# Patient Record
Sex: FEMALE | Race: WHITE | NOT HISPANIC OR LATINO | Employment: OTHER | RURAL
[De-identification: names, ages, dates, MRNs, and addresses within clinical notes are randomized per-mention and may not be internally consistent; named-entity substitution may affect disease eponyms.]

---

## 2022-04-20 RX ORDER — OMEPRAZOLE 40 MG/1
CAPSULE, DELAYED RELEASE ORAL
Qty: 90 CAPSULE | Refills: 0 | Status: SHIPPED | OUTPATIENT
Start: 2022-04-20 | End: 2022-04-20 | Stop reason: SDUPTHER

## 2022-04-21 RX ORDER — OMEPRAZOLE 40 MG/1
40 CAPSULE, DELAYED RELEASE ORAL DAILY
Qty: 90 CAPSULE | Refills: 0 | Status: SHIPPED | OUTPATIENT
Start: 2022-04-21 | End: 2022-05-16 | Stop reason: SDUPTHER

## 2022-05-12 ENCOUNTER — TELEPHONE (OUTPATIENT)
Dept: FAMILY MEDICINE CLINIC | Facility: CLINIC | Age: 67
End: 2022-05-12

## 2022-05-12 DIAGNOSIS — I10 PRIMARY HYPERTENSION: Primary | ICD-10-CM

## 2022-05-12 NOTE — TELEPHONE ENCOUNTER
Patient has upcoming appointment and would like to have labs drawn prior. Please place lab orders.

## 2022-05-13 ENCOUNTER — LAB (OUTPATIENT)
Dept: FAMILY MEDICINE CLINIC | Facility: CLINIC | Age: 67
End: 2022-05-13

## 2022-05-13 DIAGNOSIS — I10 PRIMARY HYPERTENSION: ICD-10-CM

## 2022-05-13 PROCEDURE — 36415 COLL VENOUS BLD VENIPUNCTURE: CPT | Performed by: FAMILY MEDICINE

## 2022-05-13 NOTE — TELEPHONE ENCOUNTER
Notified patients daughter. Looks like patients bw appt was canceled. Advised her that if she wants to come in today she can and we can add her to the bw schedule or just wait for her appt on Monday.

## 2022-05-14 LAB
ALBUMIN SERPL-MCNC: 3.9 G/DL (ref 3.8–4.8)
ALBUMIN/GLOB SERPL: 1.6 {RATIO} (ref 1.2–2.2)
ALP SERPL-CCNC: 81 IU/L (ref 44–121)
ALT SERPL-CCNC: 18 IU/L (ref 0–32)
AST SERPL-CCNC: 24 IU/L (ref 0–40)
BASOPHILS # BLD AUTO: 0.1 X10E3/UL (ref 0–0.2)
BASOPHILS NFR BLD AUTO: 1 %
BILIRUB SERPL-MCNC: 0.3 MG/DL (ref 0–1.2)
BUN SERPL-MCNC: 12 MG/DL (ref 8–27)
BUN/CREAT SERPL: 14 (ref 12–28)
CALCIUM SERPL-MCNC: 9 MG/DL (ref 8.7–10.3)
CHLORIDE SERPL-SCNC: 99 MMOL/L (ref 96–106)
CHOLEST SERPL-MCNC: 224 MG/DL (ref 100–199)
CO2 SERPL-SCNC: 27 MMOL/L (ref 20–29)
CREAT SERPL-MCNC: 0.84 MG/DL (ref 0.57–1)
EGFRCR SERPLBLD CKD-EPI 2021: 75 ML/MIN/1.73
EOSINOPHIL # BLD AUTO: 0.2 X10E3/UL (ref 0–0.4)
EOSINOPHIL NFR BLD AUTO: 2 %
ERYTHROCYTE [DISTWIDTH] IN BLOOD BY AUTOMATED COUNT: 14.9 % (ref 11.7–15.4)
GLOBULIN SER CALC-MCNC: 2.5 G/DL (ref 1.5–4.5)
GLUCOSE SERPL-MCNC: 115 MG/DL (ref 65–99)
HCT VFR BLD AUTO: 32.5 % (ref 34–46.6)
HDLC SERPL-MCNC: 57 MG/DL
HGB BLD-MCNC: 9.9 G/DL (ref 11.1–15.9)
IMM GRANULOCYTES # BLD AUTO: 0 X10E3/UL (ref 0–0.1)
IMM GRANULOCYTES NFR BLD AUTO: 1 %
LDLC SERPL CALC-MCNC: 146 MG/DL (ref 0–99)
LYMPHOCYTES # BLD AUTO: 2.1 X10E3/UL (ref 0.7–3.1)
LYMPHOCYTES NFR BLD AUTO: 23 %
MCH RBC QN AUTO: 24.8 PG (ref 26.6–33)
MCHC RBC AUTO-ENTMCNC: 30.5 G/DL (ref 31.5–35.7)
MCV RBC AUTO: 81 FL (ref 79–97)
MONOCYTES # BLD AUTO: 0.9 X10E3/UL (ref 0.1–0.9)
MONOCYTES NFR BLD AUTO: 10 %
NEUTROPHILS # BLD AUTO: 5.6 X10E3/UL (ref 1.4–7)
NEUTROPHILS NFR BLD AUTO: 63 %
PLATELET # BLD AUTO: 304 X10E3/UL (ref 150–450)
POTASSIUM SERPL-SCNC: 4.6 MMOL/L (ref 3.5–5.2)
PROT SERPL-MCNC: 6.4 G/DL (ref 6–8.5)
RBC # BLD AUTO: 4 X10E6/UL (ref 3.77–5.28)
SODIUM SERPL-SCNC: 140 MMOL/L (ref 134–144)
TRIGL SERPL-MCNC: 120 MG/DL (ref 0–149)
TSH SERPL DL<=0.005 MIU/L-ACNC: 1.26 UIU/ML (ref 0.45–4.5)
VLDLC SERPL CALC-MCNC: 21 MG/DL (ref 5–40)
WBC # BLD AUTO: 8.8 X10E3/UL (ref 3.4–10.8)

## 2022-05-16 ENCOUNTER — OFFICE VISIT (OUTPATIENT)
Dept: FAMILY MEDICINE CLINIC | Facility: CLINIC | Age: 67
End: 2022-05-16

## 2022-05-16 VITALS
OXYGEN SATURATION: 97 % | SYSTOLIC BLOOD PRESSURE: 132 MMHG | WEIGHT: 215 LBS | BODY MASS INDEX: 39.56 KG/M2 | HEIGHT: 62 IN | HEART RATE: 106 BPM | DIASTOLIC BLOOD PRESSURE: 68 MMHG

## 2022-05-16 DIAGNOSIS — D64.9 ANEMIA, UNSPECIFIED TYPE: ICD-10-CM

## 2022-05-16 DIAGNOSIS — J40 BRONCHITIS: Primary | ICD-10-CM

## 2022-05-16 DIAGNOSIS — E78.2 MIXED HYPERLIPIDEMIA: ICD-10-CM

## 2022-05-16 DIAGNOSIS — J44.1 COPD WITH EXACERBATION: ICD-10-CM

## 2022-05-16 DIAGNOSIS — K21.9 GASTROESOPHAGEAL REFLUX DISEASE WITHOUT ESOPHAGITIS: ICD-10-CM

## 2022-05-16 DIAGNOSIS — F41.9 ANXIETY: ICD-10-CM

## 2022-05-16 DIAGNOSIS — I10 PRIMARY HYPERTENSION: ICD-10-CM

## 2022-05-16 PROCEDURE — 36415 COLL VENOUS BLD VENIPUNCTURE: CPT | Performed by: FAMILY MEDICINE

## 2022-05-16 PROCEDURE — 99214 OFFICE O/P EST MOD 30 MIN: CPT | Performed by: FAMILY MEDICINE

## 2022-05-16 RX ORDER — LISINOPRIL 10 MG/1
10 TABLET ORAL DAILY
COMMUNITY
Start: 2022-04-18 | End: 2022-05-16 | Stop reason: SDUPTHER

## 2022-05-16 RX ORDER — FLUTICASONE PROPIONATE AND SALMETEROL 250; 50 UG/1; UG/1
1 POWDER RESPIRATORY (INHALATION)
Qty: 60 EACH | Refills: 5 | Status: SHIPPED | OUTPATIENT
Start: 2022-05-16 | End: 2022-12-08

## 2022-05-16 RX ORDER — ALPRAZOLAM 1 MG/1
1 TABLET ORAL 2 TIMES DAILY PRN
Qty: 60 TABLET | Refills: 2 | Status: SHIPPED | OUTPATIENT
Start: 2022-05-16 | End: 2022-08-25

## 2022-05-16 RX ORDER — MELOXICAM 7.5 MG/1
7.5 TABLET ORAL DAILY
COMMUNITY
Start: 2022-02-19 | End: 2022-05-16 | Stop reason: SDUPTHER

## 2022-05-16 RX ORDER — LISINOPRIL 10 MG/1
10 TABLET ORAL DAILY
Qty: 90 TABLET | Refills: 1 | Status: SHIPPED | OUTPATIENT
Start: 2022-05-16 | End: 2022-12-08 | Stop reason: SDUPTHER

## 2022-05-16 RX ORDER — MELOXICAM 7.5 MG/1
7.5 TABLET ORAL DAILY
Qty: 90 TABLET | Refills: 1 | Status: SHIPPED | OUTPATIENT
Start: 2022-05-16 | End: 2022-12-08 | Stop reason: SDUPTHER

## 2022-05-16 RX ORDER — ALPRAZOLAM 1 MG/1
1 TABLET ORAL 2 TIMES DAILY PRN
COMMUNITY
Start: 2022-04-19 | End: 2022-05-16 | Stop reason: SDUPTHER

## 2022-05-16 RX ORDER — ROSUVASTATIN CALCIUM 20 MG/1
20 TABLET, COATED ORAL DAILY
Qty: 90 TABLET | Refills: 1 | Status: SHIPPED | OUTPATIENT
Start: 2022-05-16 | End: 2022-12-08 | Stop reason: SDUPTHER

## 2022-05-16 RX ORDER — DOXYCYCLINE 100 MG/1
100 TABLET ORAL 2 TIMES DAILY
Qty: 20 TABLET | Refills: 0 | Status: SHIPPED | OUTPATIENT
Start: 2022-05-16 | End: 2022-09-08

## 2022-05-16 RX ORDER — PREDNISONE 20 MG/1
40 TABLET ORAL DAILY
COMMUNITY
Start: 2022-05-14 | End: 2022-09-08

## 2022-05-16 RX ORDER — ROSUVASTATIN CALCIUM 20 MG/1
20 TABLET, COATED ORAL DAILY
COMMUNITY
Start: 2022-04-18 | End: 2022-05-16 | Stop reason: SDUPTHER

## 2022-05-16 RX ORDER — ALBUTEROL SULFATE 1.25 MG/3ML
1 SOLUTION RESPIRATORY (INHALATION)
COMMUNITY
Start: 2022-05-14 | End: 2023-03-08 | Stop reason: SDUPTHER

## 2022-05-16 RX ORDER — OMEPRAZOLE 40 MG/1
40 CAPSULE, DELAYED RELEASE ORAL DAILY
Qty: 90 CAPSULE | Refills: 1 | Status: SHIPPED | OUTPATIENT
Start: 2022-05-16 | End: 2022-05-27

## 2022-05-17 LAB
FERRITIN SERPL-MCNC: 5 NG/ML (ref 15–150)
IRON SATN MFR SERPL: 5 % (ref 15–55)
IRON SERPL-MCNC: 20 UG/DL (ref 27–139)
TIBC SERPL-MCNC: 380 UG/DL (ref 250–450)
UIBC SERPL-MCNC: 360 UG/DL (ref 118–369)
VIT B12 SERPL-MCNC: 385 PG/ML (ref 232–1245)

## 2022-05-18 ENCOUNTER — TELEPHONE (OUTPATIENT)
Dept: FAMILY MEDICINE CLINIC | Facility: CLINIC | Age: 67
End: 2022-05-18

## 2022-05-18 NOTE — PROGRESS NOTES
Follow Up Office Visit      Date of Visit:  2022   Patient Name: Class 2 Severe Obesity (BMI >=35 and <=39.9). Obesity-related health conditions include the following: hypertension. Obesity is unchanged. BMI is is above average; BMI management plan is completed. We discussed portion control and increasing exercise.  : Class 2 Severe Obesity (BMI >=35 and <=39.9). Obesity-related health conditions include the following: hypertension. Obesity is unchanged. BMI is is above average; BMI management plan is completed. We discussed portion control and increasing exercise.   MRN: [id number]     Chief Complaint:    Chief Complaint   Patient presents with   • Med Refill   • [place]     [facility] [place] of Present Illness: Radha Aldana is a 67 y.o. female who is here today for follow up.  Patient seen for follow-up on her chronic medical conditions.  More recently had been to the hospital with a COPD exacerbation.  Still having some symptoms.  Patient needs GERD COPD hypertension hyperlipidemia.  Cmedication refills on her chronic medical conditions.  Currently being treated for anxiety conditions overall stable.        Subjective      Review of Systems:   Review of Systems   Constitutional: Negative for fatigue and fever.   HENT: Negative for congestion and ear pain.    Respiratory: Positive for shortness of breath and wheezing. Negative for apnea, cough and chest tightness.    Cardiovascular: Negative for chest pain.   Gastrointestinal: Negative for abdominal pain, constipation, diarrhea and nausea.   Musculoskeletal: Negative for arthralgias.   Psychiatric/Behavioral: Negative for depressed mood and stress.       Past Medical History:   Past Medical History:   Diagnosis Date   • Anxiety    • Chronic GERD    • Emphysema    • Essential hypertension    • Mixed hyperlipidemia        Past Surgical History: History reviewed. No pertinent surgical history.    Family History:   Family History   Problem  "Relation Age of Onset   • Hypertension Father        Social History:   Social History     Socioeconomic History   • Marital status:    Tobacco Use   • Smoking status: Former Smoker     Packs/day: 1.00     Years: 55.00     Pack years: 55.00     Start date:      Quit date:      Years since quittin.3   • Smokeless tobacco: Never Used   Substance and Sexual Activity   • Alcohol use: Defer   • Drug use: Never   • Sexual activity: Defer       Medications:     Current Outpatient Medications:   •  albuterol (ACCUNEB) 1.25 MG/3ML nebulizer solution, 1 ampule., Disp: , Rfl:   •  ALPRAZolam (XANAX) 1 MG tablet, Take 1 tablet by mouth 2 (Two) Times a Day As Needed for Anxiety., Disp: 60 tablet, Rfl: 2  •  lisinopril (PRINIVIL,ZESTRIL) 10 MG tablet, Take 1 tablet by mouth Daily., Disp: 90 tablet, Rfl: 1  •  meloxicam (MOBIC) 7.5 MG tablet, Take 1 tablet by mouth Daily., Disp: 90 tablet, Rfl: 1  •  omeprazole (priLOSEC) 40 MG capsule, Take 1 capsule by mouth Daily. before a meal, Disp: 90 capsule, Rfl: 1  •  predniSONE (DELTASONE) 20 MG tablet, Take 40 mg by mouth Daily., Disp: , Rfl:   •  rosuvastatin (CRESTOR) 20 MG tablet, Take 1 tablet by mouth Daily., Disp: 90 tablet, Rfl: 1  •  doxycycline (ADOXA) 100 MG tablet, Take 1 tablet by mouth 2 (Two) Times a Day., Disp: 20 tablet, Rfl: 0  •  Fluticasone-Salmeterol (Advair Diskus) 250-50 MCG/ACT DISKUS, Inhale 1 puff 2 (Two) Times a Day., Disp: 60 each, Rfl: 5    Allergies:   No Known Allergies    Objective     Physical Exam:  Vital Signs:   Vitals:    22 1319   BP: 132/68   BP Location: Left arm   Patient Position: Sitting   Cuff Size: Large Adult   Pulse: 106   SpO2: 97%   Weight: 97.5 kg (215 lb)   Height: 157.5 cm (62\")     Body mass index is 39.32 kg/m².     Physical Exam  Vitals and nursing note reviewed.   Constitutional:       Appearance: She is ill-appearing.   HENT:      Head: Normocephalic and atraumatic.   Cardiovascular:      Rate and Rhythm: " Normal rate and regular rhythm.   Pulmonary:      Breath sounds: Decreased air movement present. Wheezing present.   Neurological:      General: No focal deficit present.      Mental Status: She is alert and oriented to person, place, and time.   Psychiatric:         Mood and Affect: Mood normal.         Procedures           Assessment / Plan      Assessment/Plan:   Diagnoses and all orders for this visit:    1. Bronchitis (Primary)    2. Anemia, unspecified type  -     Vitamin B12; Future  -     Ferritin; Future  -     Iron and TIBC; Future  -     POCT urinalysis dipstick, automated  -     Vitamin B12  -     Ferritin  -     Iron and TIBC    3. Anxiety  -     ALPRAZolam (XANAX) 1 MG tablet; Take 1 tablet by mouth 2 (Two) Times a Day As Needed for Anxiety.  Dispense: 60 tablet; Refill: 2    4. Primary hypertension    5. Gastroesophageal reflux disease without esophagitis    6. Mixed hyperlipidemia    7. COPD with exacerbation (HCC)    Other orders  -     doxycycline (ADOXA) 100 MG tablet; Take 1 tablet by mouth 2 (Two) Times a Day.  Dispense: 20 tablet; Refill: 0  -     Fluticasone-Salmeterol (Advair Diskus) 250-50 MCG/ACT DISKUS; Inhale 1 puff 2 (Two) Times a Day.  Dispense: 60 each; Refill: 5  -     lisinopril (PRINIVIL,ZESTRIL) 10 MG tablet; Take 1 tablet by mouth Daily.  Dispense: 90 tablet; Refill: 1  -     meloxicam (MOBIC) 7.5 MG tablet; Take 1 tablet by mouth Daily.  Dispense: 90 tablet; Refill: 1  -     omeprazole (priLOSEC) 40 MG capsule; Take 1 capsule by mouth Daily. before a meal  Dispense: 90 capsule; Refill: 1  -     rosuvastatin (CRESTOR) 20 MG tablet; Take 1 tablet by mouth Daily.  Dispense: 90 tablet; Refill: 1         Medication refills given on her chronic medical conditions.  Did give doxycycline for her current COPD exacerbation.  Blood work obtained for her chronic medical conditions as well as her anemia.    Follow Up:   Return in about 3 months (around 8/16/2022) for Recheck.    Wilfrid  [facility] [place]

## 2022-05-27 RX ORDER — OMEPRAZOLE 40 MG/1
CAPSULE, DELAYED RELEASE ORAL
Qty: 90 CAPSULE | Refills: 0 | Status: SHIPPED | OUTPATIENT
Start: 2022-05-27 | End: 2022-12-08 | Stop reason: SDUPTHER

## 2022-08-19 DIAGNOSIS — F41.9 ANXIETY: ICD-10-CM

## 2022-08-19 NOTE — TELEPHONE ENCOUNTER
Rx Refill Note  Requested Prescriptions     Pending Prescriptions Disp Refills    ALPRAZolam (XANAX) 1 MG tablet [Pharmacy Med Name: ALPRAZolam 1 MG Oral Tablet] 60 tablet 0     Sig: Take 1 tablet by mouth twice daily as needed for anxiety      Last office visit with prescribing clinician: 5/16/2022      Next office visit with prescribing clinician: 9/8/2022            Kanchan Anderson MA  08/19/22, 14:48 EDT

## 2022-08-25 RX ORDER — ALPRAZOLAM 1 MG/1
TABLET ORAL
Qty: 15 TABLET | Refills: 0 | Status: SHIPPED | OUTPATIENT
Start: 2022-08-25 | End: 2022-09-08 | Stop reason: SDUPTHER

## 2022-09-06 ENCOUNTER — CLINICAL SUPPORT (OUTPATIENT)
Dept: FAMILY MEDICINE CLINIC | Facility: CLINIC | Age: 67
End: 2022-09-06

## 2022-09-06 DIAGNOSIS — Z79.899 ENCOUNTER FOR LONG-TERM (CURRENT) USE OF OTHER MEDICATIONS: Primary | ICD-10-CM

## 2022-09-06 LAB
POC AMPHETAMINES: NEGATIVE
POC BARBITURATES: NEGATIVE
POC BENZODIAZEPHINES: POSITIVE
POC COCAINE: NEGATIVE
POC METHADONE: NEGATIVE
POC METHAMPHETAMINE SCREEN URINE: NEGATIVE
POC OPIATES: NEGATIVE
POC OXYCODONE: NEGATIVE
POC PHENCYCLIDINE: NEGATIVE
POC PROPOXYPHENE: NEGATIVE
POC THC: NEGATIVE
POC TRICYCLIC ANTIDEPRESSANTS: NEGATIVE

## 2022-09-06 PROCEDURE — 80305 DRUG TEST PRSMV DIR OPT OBS: CPT | Performed by: FAMILY MEDICINE

## 2022-09-07 ENCOUNTER — TELEPHONE (OUTPATIENT)
Dept: FAMILY MEDICINE CLINIC | Facility: CLINIC | Age: 67
End: 2022-09-07

## 2022-09-07 NOTE — TELEPHONE ENCOUNTER
Spoke with patient and informed her this was probably just a reminder call due to the fact there is nothing in her chart about needing to schedule.

## 2022-09-07 NOTE — TELEPHONE ENCOUNTER
Caller: Radha Aldana    Relationship to patient: Self    Best call back number:     Patient is needing: PATIENT WAS RETURNING CALL TO THE OFFICE FOR A TEST THAT IS TO BE SCHEDULED?     PLEASE TO CALL ADVISE

## 2022-09-08 ENCOUNTER — OFFICE VISIT (OUTPATIENT)
Dept: FAMILY MEDICINE CLINIC | Facility: CLINIC | Age: 67
End: 2022-09-08

## 2022-09-08 VITALS
HEIGHT: 62 IN | HEART RATE: 110 BPM | SYSTOLIC BLOOD PRESSURE: 124 MMHG | WEIGHT: 219 LBS | DIASTOLIC BLOOD PRESSURE: 78 MMHG | OXYGEN SATURATION: 98 % | BODY MASS INDEX: 40.3 KG/M2

## 2022-09-08 DIAGNOSIS — F41.9 ANXIETY: ICD-10-CM

## 2022-09-08 DIAGNOSIS — M54.50 LOW BACK PAIN, UNSPECIFIED BACK PAIN LATERALITY, UNSPECIFIED CHRONICITY, UNSPECIFIED WHETHER SCIATICA PRESENT: ICD-10-CM

## 2022-09-08 DIAGNOSIS — D64.9 ANEMIA, UNSPECIFIED TYPE: Primary | ICD-10-CM

## 2022-09-08 DIAGNOSIS — R30.0 DYSURIA: ICD-10-CM

## 2022-09-08 LAB
BILIRUB BLD-MCNC: NEGATIVE MG/DL
CLARITY, POC: CLEAR
COLOR UR: YELLOW
EXPIRATION DATE: ABNORMAL
GLUCOSE UR STRIP-MCNC: NEGATIVE MG/DL
KETONES UR QL: NEGATIVE
LEUKOCYTE EST, POC: ABNORMAL
Lab: ABNORMAL
NITRITE UR-MCNC: NEGATIVE MG/ML
PH UR: 7 [PH] (ref 5–8)
PROT UR STRIP-MCNC: ABNORMAL MG/DL
RBC # UR STRIP: NEGATIVE /UL
SP GR UR: 1.02 (ref 1–1.03)
UROBILINOGEN UR QL: NORMAL

## 2022-09-08 PROCEDURE — 36415 COLL VENOUS BLD VENIPUNCTURE: CPT | Performed by: FAMILY MEDICINE

## 2022-09-08 PROCEDURE — 99214 OFFICE O/P EST MOD 30 MIN: CPT | Performed by: FAMILY MEDICINE

## 2022-09-08 PROCEDURE — 81003 URINALYSIS AUTO W/O SCOPE: CPT | Performed by: FAMILY MEDICINE

## 2022-09-08 RX ORDER — ALPRAZOLAM 1 MG/1
1 TABLET ORAL 2 TIMES DAILY PRN
Qty: 60 TABLET | Refills: 2 | Status: SHIPPED | OUTPATIENT
Start: 2022-09-08 | End: 2022-12-08 | Stop reason: SDUPTHER

## 2022-09-08 NOTE — PROGRESS NOTES
Follow Up Office Visit      Date of Visit:  2022   Patient Name: Radha Aldana  : 1955   MRN: [id number]     Chief Complaint:    Chief Complaint   Patient presents with   • Med Refill       History of Present Illness: Radha Aldana is a 67 y.o. female who is here today for follow up.  Patient comes in for refills on her anxiety medication.  Medical conditions overall stable.  Carlos report appropriate.  Patient also with recent anemia and needed blood work for reevaluation.  Also with UTI symptoms.        Subjective      Review of Systems:   Review of Systems   Constitutional: Negative for fatigue and fever.   HENT: Negative for congestion and ear pain.    Respiratory: Negative for apnea, cough, chest tightness and shortness of breath.    Cardiovascular: Negative for chest pain.   Gastrointestinal: Negative for abdominal pain, constipation, diarrhea and nausea.   Musculoskeletal: Negative for arthralgias.   Psychiatric/Behavioral: Negative for depressed mood and stress.       Past Medical History:   Past Medical History:   Diagnosis Date   • Anxiety    • Chronic GERD    • Emphysema    • Essential hypertension    • Mixed hyperlipidemia        Past Surgical History: No past surgical history on file.    Family History:   Family History   Problem Relation Age of Onset   • Hypertension Father        Social History:   Social History     Socioeconomic History   • Marital status:    Tobacco Use   • Smoking status: Former Smoker     Packs/day: 1.00     Years: 55.00     Pack years: 55.00     Start date:      Quit date:      Years since quittin.6   • Smokeless tobacco: Never Used   Substance and Sexual Activity   • Alcohol use: Defer   • Drug use: Never   • Sexual activity: Defer       Medications:     Current Outpatient Medications:   •  albuterol (ACCUNEB) 1.25 MG/3ML nebulizer solution, 1 ampule., Disp: , Rfl:   •  ALPRAZolam (XANAX) 1 MG tablet, Take 1 tablet by mouth 2 (Two) Times a  "Day As Needed for Anxiety. for anxiety, Disp: 60 tablet, Rfl: 2  •  Fluticasone-Salmeterol (Advair Diskus) 250-50 MCG/ACT DISKUS, Inhale 1 puff 2 (Two) Times a Day., Disp: 60 each, Rfl: 5  •  lisinopril (PRINIVIL,ZESTRIL) 10 MG tablet, Take 1 tablet by mouth Daily., Disp: 90 tablet, Rfl: 1  •  meloxicam (MOBIC) 7.5 MG tablet, Take 1 tablet by mouth Daily., Disp: 90 tablet, Rfl: 1  •  omeprazole (priLOSEC) 40 MG capsule, TAKE 1 CAPSULE BY MOUTH ONCE DAILY BEFORE A MEAL, Disp: 90 capsule, Rfl: 0  •  rosuvastatin (CRESTOR) 20 MG tablet, Take 1 tablet by mouth Daily., Disp: 90 tablet, Rfl: 1    Allergies:   Allergies   Allergen Reactions   • Zocor [Simvastatin] Myalgia       Objective     Physical Exam:  Vital Signs:   Vitals:    09/08/22 1333   BP: 124/78   Pulse: 110   SpO2: 98%   Weight: 99.3 kg (219 lb)   Height: 157.5 cm (62\")     Body mass index is 40.06 kg/m².     Physical Exam  Vitals and nursing note reviewed.   Constitutional:       General: She is not in acute distress.     Appearance: Normal appearance. She is not ill-appearing.   HENT:      Head: Normocephalic and atraumatic.      Right Ear: Tympanic membrane and ear canal normal.      Left Ear: Tympanic membrane and ear canal normal.      Nose: Nose normal.   Cardiovascular:      Rate and Rhythm: Normal rate and regular rhythm.      Heart sounds: Normal heart sounds.   Pulmonary:      Effort: Pulmonary effort is normal.      Breath sounds: Normal breath sounds.   Neurological:      Mental Status: She is alert and oriented to person, place, and time. Mental status is at baseline.   Psychiatric:         Mood and Affect: Mood normal.         Procedures      Assessment / Plan      Assessment/Plan:   Diagnoses and all orders for this visit:    1. Anemia, unspecified type (Primary)  -     CBC w AUTO Differential; Future  -     Vitamin B12; Future  -     Ferritin; Future  -     CBC w AUTO Differential  -     Vitamin B12  -     Ferritin    2. Anxiety  -     " ALPRAZolam (XANAX) 1 MG tablet; Take 1 tablet by mouth 2 (Two) Times a Day As Needed for Anxiety. for anxiety  Dispense: 60 tablet; Refill: 2    3. Low back pain, unspecified back pain laterality, unspecified chronicity, unspecified whether sciatica present  -     POCT urinalysis dipstick, automated    4. Dysuria  -     Urine Culture - Urine, Urine, Clean Catch; Future  -     Urine Culture - Urine, Urine, Clean Catch         Check lab work for anemia.  Refill medications for anxiety.  Treat urine.    Follow Up:   No follow-ups on file.    Wilfrid Victoria  [facility] [place] [place]

## 2022-09-09 LAB
BASOPHILS # BLD AUTO: 0.1 X10E3/UL (ref 0–0.2)
BASOPHILS NFR BLD AUTO: 1 %
EOSINOPHIL # BLD AUTO: 0.3 X10E3/UL (ref 0–0.4)
EOSINOPHIL NFR BLD AUTO: 3 %
ERYTHROCYTE [DISTWIDTH] IN BLOOD BY AUTOMATED COUNT: 12.8 % (ref 11.7–15.4)
FERRITIN SERPL-MCNC: 15 NG/ML (ref 15–150)
HCT VFR BLD AUTO: 37.7 % (ref 34–46.6)
HGB BLD-MCNC: 12.4 G/DL (ref 11.1–15.9)
IMM GRANULOCYTES # BLD AUTO: 0 X10E3/UL (ref 0–0.1)
IMM GRANULOCYTES NFR BLD AUTO: 0 %
LYMPHOCYTES # BLD AUTO: 2.8 X10E3/UL (ref 0.7–3.1)
LYMPHOCYTES NFR BLD AUTO: 27 %
MCH RBC QN AUTO: 29.7 PG (ref 26.6–33)
MCHC RBC AUTO-ENTMCNC: 32.9 G/DL (ref 31.5–35.7)
MCV RBC AUTO: 90 FL (ref 79–97)
MONOCYTES # BLD AUTO: 1 X10E3/UL (ref 0.1–0.9)
MONOCYTES NFR BLD AUTO: 10 %
NEUTROPHILS # BLD AUTO: 6.1 X10E3/UL (ref 1.4–7)
NEUTROPHILS NFR BLD AUTO: 59 %
PLATELET # BLD AUTO: 224 X10E3/UL (ref 150–450)
RBC # BLD AUTO: 4.17 X10E6/UL (ref 3.77–5.28)
VIT B12 SERPL-MCNC: 336 PG/ML (ref 232–1245)
WBC # BLD AUTO: 10.3 X10E3/UL (ref 3.4–10.8)

## 2022-09-10 LAB
BACTERIA UR CULT: NORMAL
BACTERIA UR CULT: NORMAL

## 2022-09-12 ENCOUNTER — TELEPHONE (OUTPATIENT)
Dept: FAMILY MEDICINE CLINIC | Facility: CLINIC | Age: 67
End: 2022-09-12

## 2022-09-12 NOTE — TELEPHONE ENCOUNTER
Caller: Radha Aldana    Relationship: Self    Best call back number: [phone]    Caller requesting test results: RADHA    What test was performed: LABS    When was the test performed: 9/8/22    Where was the test performed: IN OFFICE    Additional notes: PLEASE CALL PATIENT WITH THE RESULTS

## 2022-09-13 NOTE — TELEPHONE ENCOUNTER
No anemia seen on labs this time.  Her blood counts are normal.  Her urine also did not grow any bacteria.  No UTI.

## 2022-09-14 ENCOUNTER — TELEPHONE (OUTPATIENT)
Dept: FAMILY MEDICINE CLINIC | Facility: CLINIC | Age: 67
End: 2022-09-14

## 2022-09-14 NOTE — TELEPHONE ENCOUNTER
Pt returned call. Went over lab results. She said she is still having trouble with burning. We spoke for a few minutes and she said it is not an urge to go or burning when she urinates, it is the skin on the exterior of her vagina that is painful. Would like to know if there is an OTC or prescription cream that would help with that. She said that she has sensitive skin.  Please call to advise [phone]

## 2022-09-15 RX ORDER — NYSTATIN 100000 U/G
1 CREAM TOPICAL 2 TIMES DAILY
Qty: 30 G | Refills: 1 | Status: SHIPPED | OUTPATIENT
Start: 2022-09-15 | End: 2023-02-01

## 2022-12-08 ENCOUNTER — OFFICE VISIT (OUTPATIENT)
Dept: FAMILY MEDICINE CLINIC | Facility: CLINIC | Age: 67
End: 2022-12-08

## 2022-12-08 VITALS
BODY MASS INDEX: 41.77 KG/M2 | WEIGHT: 227 LBS | HEART RATE: 115 BPM | OXYGEN SATURATION: 97 % | SYSTOLIC BLOOD PRESSURE: 144 MMHG | DIASTOLIC BLOOD PRESSURE: 82 MMHG | HEIGHT: 62 IN

## 2022-12-08 DIAGNOSIS — Z00.00 WELLNESS EXAMINATION: Primary | ICD-10-CM

## 2022-12-08 DIAGNOSIS — F41.9 ANXIETY: ICD-10-CM

## 2022-12-08 DIAGNOSIS — F51.01 PRIMARY INSOMNIA: ICD-10-CM

## 2022-12-08 DIAGNOSIS — M25.551 RIGHT HIP PAIN: ICD-10-CM

## 2022-12-08 DIAGNOSIS — J44.9 CHRONIC OBSTRUCTIVE PULMONARY DISEASE, UNSPECIFIED COPD TYPE: ICD-10-CM

## 2022-12-08 DIAGNOSIS — E78.2 MIXED HYPERLIPIDEMIA: ICD-10-CM

## 2022-12-08 DIAGNOSIS — I10 PRIMARY HYPERTENSION: ICD-10-CM

## 2022-12-08 PROCEDURE — 96160 PT-FOCUSED HLTH RISK ASSMT: CPT | Performed by: FAMILY MEDICINE

## 2022-12-08 PROCEDURE — 1159F MED LIST DOCD IN RCRD: CPT | Performed by: FAMILY MEDICINE

## 2022-12-08 PROCEDURE — G0439 PPPS, SUBSEQ VISIT: HCPCS | Performed by: FAMILY MEDICINE

## 2022-12-08 PROCEDURE — 1170F FXNL STATUS ASSESSED: CPT | Performed by: FAMILY MEDICINE

## 2022-12-08 PROCEDURE — 99214 OFFICE O/P EST MOD 30 MIN: CPT | Performed by: FAMILY MEDICINE

## 2022-12-08 RX ORDER — TIOTROPIUM BROMIDE AND OLODATEROL 3.124; 2.736 UG/1; UG/1
2 SPRAY, METERED RESPIRATORY (INHALATION) DAILY
COMMUNITY
Start: 2022-11-11

## 2022-12-08 RX ORDER — LISINOPRIL 10 MG/1
10 TABLET ORAL DAILY
Qty: 90 TABLET | Refills: 1 | Status: SHIPPED | OUTPATIENT
Start: 2022-12-08 | End: 2023-03-28 | Stop reason: SDUPTHER

## 2022-12-08 RX ORDER — TRAZODONE HYDROCHLORIDE 50 MG/1
50 TABLET ORAL NIGHTLY
Qty: 90 TABLET | Refills: 1 | Status: SHIPPED | OUTPATIENT
Start: 2022-12-08

## 2022-12-08 RX ORDER — ALPRAZOLAM 1 MG/1
1 TABLET ORAL 2 TIMES DAILY PRN
Qty: 60 TABLET | Refills: 2 | Status: SHIPPED | OUTPATIENT
Start: 2022-12-08 | End: 2023-03-08 | Stop reason: SDUPTHER

## 2022-12-08 RX ORDER — OMEPRAZOLE 40 MG/1
40 CAPSULE, DELAYED RELEASE ORAL DAILY
Qty: 90 CAPSULE | Refills: 1 | Status: SHIPPED | OUTPATIENT
Start: 2022-12-08

## 2022-12-08 RX ORDER — MELOXICAM 7.5 MG/1
7.5 TABLET ORAL DAILY
Qty: 90 TABLET | Refills: 1 | Status: SHIPPED | OUTPATIENT
Start: 2022-12-08

## 2022-12-08 RX ORDER — HYDROCODONE BITARTRATE AND ACETAMINOPHEN 5; 325 MG/1; MG/1
1 TABLET ORAL EVERY 6 HOURS PRN
Qty: 15 TABLET | Refills: 0 | Status: SHIPPED | OUTPATIENT
Start: 2022-12-08

## 2022-12-08 RX ORDER — ROSUVASTATIN CALCIUM 20 MG/1
20 TABLET, COATED ORAL DAILY
Qty: 90 TABLET | Refills: 1 | Status: SHIPPED | OUTPATIENT
Start: 2022-12-08

## 2022-12-08 NOTE — PROGRESS NOTES
The ABCs of the Annual Wellness Visit  Subsequent Medicare Wellness Visit    Subjective    Radha Aldana is a 67 y.o. female who presents for a Subsequent Medicare Wellness Visit.    The following portions of the patient's history were reviewed and   updated as appropriate: allergies, current medications, past family history, past medical history, past social history, past surgical history and problem list.    Compared to one year ago, the patient feels her physical   health is the same.    Compared to one year ago, the patient feels her mental   health is the same.    Recent Hospitalizations:  She was admitted within the past 365 days at [facility].       Current Medical Providers:  Patient Care Team:  Wilfrid Victoria MD as PCP - General (Family Medicine)    Outpatient Medications Prior to Visit   Medication Sig Dispense Refill   • albuterol (ACCUNEB) 1.25 MG/3ML nebulizer solution 1 ampule.     • nystatin (MYCOSTATIN) 958261 UNIT/GM cream Apply 1 application topically to the appropriate area as directed 2 (Two) Times a Day. 30 g 1   • Stiolto Respimat 2.5-2.5 MCG/ACT aerosol solution inhaler Inhale 2 puffs Daily.     • ALPRAZolam (XANAX) 1 MG tablet Take 1 tablet by mouth 2 (Two) Times a Day As Needed for Anxiety. for anxiety 60 tablet 2   • lisinopril (PRINIVIL,ZESTRIL) 10 MG tablet Take 1 tablet by mouth Daily. 90 tablet 1   • meloxicam (MOBIC) 7.5 MG tablet Take 1 tablet by mouth Daily. 90 tablet 1   • omeprazole (priLOSEC) 40 MG capsule TAKE 1 CAPSULE BY MOUTH ONCE DAILY BEFORE A MEAL 90 capsule 0   • rosuvastatin (CRESTOR) 20 MG tablet Take 1 tablet by mouth Daily. 90 tablet 1   • Fluticasone-Salmeterol (Advair Diskus) 250-50 MCG/ACT DISKUS Inhale 1 puff 2 (Two) Times a Day. 60 each 5     No facility-administered medications prior to visit.       Opioid medication/s are on active medication list.  and I have evaluated her active treatment plan and pain score trends (see table).  There were no vitals  "filed for this visit.  I have reviewed the chart for potential of high risk medication and harmful drug interactions in the elderly.            Aspirin is not on active medication list.  Aspirin use is not indicated based on review of current medical condition/s. Risk of harm outweighs potential benefits.  .    There is no problem list on file for this patient.    Advance Care Planning  Advance Directive is not on file.  ACP discussion was held with the patient during this visit. Patient does not have an advance directive, information provided.     Objective    Vitals:    22 1313   BP: 144/82   Pulse: 115   SpO2: 97%   Weight: 103 kg (227 lb)   Height: 157.5 cm (62\")     Estimated body mass index is 41.52 kg/m² as calculated from the following:    Height as of this encounter: 157.5 cm (62\").    Weight as of this encounter: 103 kg (227 lb).    Class 3 Severe Obesity (BMI >=40). Obesity-related health conditions include the following: hypertension and osteoarthritis. Obesity is unchanged. BMI is is above average; BMI management plan is completed. We discussed portion control and increasing exercise.      Does the patient have evidence of cognitive impairment? No          HEALTH RISK ASSESSMENT    Smoking Status:  Social History     Tobacco Use   Smoking Status Former   • Packs/day: 1.00   • Years: 55.00   • Pack years: 55.00   • Types: Cigarettes   • Start date:    • Quit date:    • Years since quittin.9   Smokeless Tobacco Never     Alcohol Consumption:  Social History     Substance and Sexual Activity   Alcohol Use Defer     Fall Risk Screen:    STEADI Fall Risk Assessment was completed, and patient is at LOW risk for falls.Assessment completed on:2022    Depression Screening:  PHQ-2/PHQ-9 Depression Screening 2022   Little Interest or Pleasure in Doing Things 0-->not at all   Feeling Down, Depressed or Hopeless 0-->not at all   PHQ-9: Brief Depression Severity Measure Score 0 "       Health Habits and Functional and Cognitive Screening:  Functional & Cognitive Status 12/8/2022   Do you have difficulty preparing food and eating? Yes   Do you have difficulty bathing yourself, getting dressed or grooming yourself? No   Do you have difficulty using the toilet? No   Do you have difficulty moving around from place to place? No   Do you have trouble with steps or getting out of a bed or a chair? No   Current Diet Well Balanced Diet   Dental Exam Other        Dental Exam Comment false teeth   Eye Exam Up to date   Exercise (times per week) 0 times per week   Current Exercises Include No Regular Exercise   Do you need help using the phone?  No   Are you deaf or do you have serious difficulty hearing?  No   Do you need help with transportation? No   Do you need help shopping? No   Do you need help preparing meals?  No   Do you need help with housework?  No   Do you need help with laundry? No   Do you need help taking your medications? No   Do you need help managing money? No   Do you ever drive or ride in a car without wearing a seat belt? No   Have you felt unusual stress, anger or loneliness in the last month? No   Who do you live with? Spouse   If you need help, do you have trouble finding someone available to you? No   Have you been bothered in the last four weeks by sexual problems? No   Do you have difficulty concentrating, remembering or making decisions? No       Age-appropriate Screening Schedule:  Refer to the list below for future screening recommendations based on patient's age, sex and/or medical conditions. Orders for these recommended tests are listed in the plan section. The patient has been provided with a written plan.    Health Maintenance   Topic Date Due   • MAMMOGRAM  Never done   • DXA SCAN  Never done   • TDAP/TD VACCINES (1 - Tdap) Never done   • ZOSTER VACCINE (1 of 2) Never done   • INFLUENZA VACCINE  03/31/2023 (Originally 8/1/2022)   • LIPID PANEL  05/13/2023           "      CMS Preventative Services Quick Reference  Risk Factors Identified During Encounter  None Identified  The above risks/problems have been discussed with the patient.  Pertinent information has been shared with the patient in the After Visit Summary.  An After Visit Summary and PPPS were made available to the patient.    Follow Up:   Next Medicare Wellness visit to be scheduled in 1 year.       Additional E&M Note during same encounter follows:  Patient has multiple medical problems which are significant and separately identifiable that require additional work above and beyond the Medicare Wellness Visit.      Chief Complaint  Annual Exam (Pt is here for a annual wellness exam today. ) and Hip Pain (C/o right hip pain. )    Subjective        Patient also with severe pain in her right hip today.  Appears to have a severe bursitis.  Having hard time sitting for very long or walking around either.    Radha Aldana is also being seen today for annual wellness exam.    Review of Systems   Constitutional: Negative for fatigue.   HENT: Negative for congestion.    Respiratory: Negative for apnea, cough and shortness of breath.    Cardiovascular: Negative for chest pain.   Gastrointestinal: Negative for abdominal pain.   Musculoskeletal: Positive for arthralgias and gait problem.   Neurological: Negative for dizziness.   Psychiatric/Behavioral: The patient is not nervous/anxious.        Objective   Vital Signs:  /82   Pulse 115   Ht 157.5 cm (62\")   Wt 103 kg (227 lb)   SpO2 97%   BMI 41.52 kg/m²     Physical Exam  Vitals and nursing note reviewed.   Constitutional:       General: She is not in acute distress.     Appearance: Normal appearance. She is not ill-appearing.   HENT:      Head: Normocephalic and atraumatic.      Right Ear: Tympanic membrane and ear canal normal.      Left Ear: Tympanic membrane and ear canal normal.      Nose: Nose normal.   Cardiovascular:      Rate and Rhythm: Normal rate and " regular rhythm.      Heart sounds: Normal heart sounds.   Pulmonary:      Effort: Pulmonary effort is normal.      Breath sounds: Normal breath sounds.   Musculoskeletal:      Comments: Pain on palpation of right hip   Neurological:      Mental Status: She is alert and oriented to person, place, and time. Mental status is at baseline.   Psychiatric:         Mood and Affect: Mood normal.                         Assessment and Plan   Diagnoses and all orders for this visit:    1. Wellness examination (Primary)    2. Chronic obstructive pulmonary disease, unspecified COPD type (HCC)    3. Right hip pain  -     HYDROcodone-acetaminophen (NORCO) 5-325 MG per tablet; Take 1 tablet by mouth Every 6 (Six) Hours As Needed for Moderate Pain.  Dispense: 15 tablet; Refill: 0    4. Anxiety  -     ALPRAZolam (XANAX) 1 MG tablet; Take 1 tablet by mouth 2 (Two) Times a Day As Needed for Anxiety. for anxiety  Dispense: 60 tablet; Refill: 2    5. Mixed hyperlipidemia    6. Primary insomnia    7. Primary hypertension    Other orders  -     omeprazole (priLOSEC) 40 MG capsule; Take 1 capsule by mouth Daily. before a meal  Dispense: 90 capsule; Refill: 1  -     lisinopril (PRINIVIL,ZESTRIL) 10 MG tablet; Take 1 tablet by mouth Daily.  Dispense: 90 tablet; Refill: 1  -     meloxicam (MOBIC) 7.5 MG tablet; Take 1 tablet by mouth Daily.  Dispense: 90 tablet; Refill: 1  -     rosuvastatin (CRESTOR) 20 MG tablet; Take 1 tablet by mouth Daily.  Dispense: 90 tablet; Refill: 1  -     traZODone (DESYREL) 50 MG tablet; Take 1 tablet by mouth Every Night.  Dispense: 90 tablet; Refill: 1    Updated annual wellness visit checklist.  Immunizations discussed.  Screening up-to-date.  Recommend yearly dental and eye exams. Also discussed monitoring of blood pressure and lipids. We addressed patient self-assessment of health status, frailty, and physical functioning. We reviewed psychosocial risks, behavioral risks, instrumental activities of daily  living, and patient health risk assessment. Patient was given a personalized prevention plan.       Also gave steroid injection today for bursitis.  Refilled some chronic medications that were needed as well.         Follow Up   No follow-ups on file.  Patient was given instructions and counseling regarding her condition or for health maintenance advice. Please see specific information pulled into the AVS if appropriate.

## 2022-12-22 ENCOUNTER — TELEPHONE (OUTPATIENT)
Dept: FAMILY MEDICINE CLINIC | Facility: CLINIC | Age: 67
End: 2022-12-22

## 2022-12-22 NOTE — TELEPHONE ENCOUNTER
Caller: Radha Aldana    Relationship: Self    Best call back number:[phone]    What medication are you requesting: PREDNISONE AND A TREATMENT FOR COPD    If a prescription is needed, what is your preferred pharmacy and phone number: [facility] [address] [place] [address] [phone] [facility] [phone]      Additional notes: PATIENT STATES THAT THE MEDICATIONS WERE SUPPOSED TO BEEN SENT LAST WEEK AND WASN'T.

## 2022-12-23 RX ORDER — AZITHROMYCIN 250 MG/1
TABLET, FILM COATED ORAL
Qty: 6 TABLET | Refills: 0 | Status: SHIPPED | OUTPATIENT
Start: 2022-12-23 | End: 2023-03-08

## 2022-12-23 RX ORDER — PREDNISONE 20 MG/1
TABLET ORAL
Qty: 18 TABLET | Refills: 0 | Status: SHIPPED | OUTPATIENT
Start: 2022-12-23 | End: 2023-03-08

## 2023-02-01 RX ORDER — NYSTATIN 100000 U/G
CREAM TOPICAL
Qty: 30 G | Refills: 0 | Status: SHIPPED | OUTPATIENT
Start: 2023-02-01

## 2023-03-06 ENCOUNTER — TELEPHONE (OUTPATIENT)
Dept: FAMILY MEDICINE CLINIC | Facility: CLINIC | Age: 68
End: 2023-03-06
Payer: MEDICARE

## 2023-03-06 NOTE — TELEPHONE ENCOUNTER
Caller: [facility]    Relationship: Home [facility]    Best call back number: [phone]    What orders are you requesting (i.e. lab or imaging): HOME HEALTH NURSING    In what timeframe would the patient need to come in:AS SOON AS POSSIBLE     Where will you receive your lab/imaging services:IN HOME     Additional notes:PLEASE CALL WITH VERBAL ORDERS

## 2023-03-08 ENCOUNTER — OFFICE VISIT (OUTPATIENT)
Dept: FAMILY MEDICINE CLINIC | Facility: CLINIC | Age: 68
End: 2023-03-08
Payer: MEDICARE

## 2023-03-08 VITALS
BODY MASS INDEX: 41.77 KG/M2 | HEART RATE: 63 BPM | WEIGHT: 227 LBS | OXYGEN SATURATION: 93 % | SYSTOLIC BLOOD PRESSURE: 120 MMHG | DIASTOLIC BLOOD PRESSURE: 70 MMHG | HEIGHT: 62 IN

## 2023-03-08 DIAGNOSIS — F41.9 ANXIETY: ICD-10-CM

## 2023-03-08 DIAGNOSIS — Z79.899 HIGH RISK MEDICATION USE: ICD-10-CM

## 2023-03-08 DIAGNOSIS — J44.9 CHRONIC OBSTRUCTIVE PULMONARY DISEASE, UNSPECIFIED COPD TYPE: Primary | ICD-10-CM

## 2023-03-08 DIAGNOSIS — I50.31 ACUTE DIASTOLIC CONGESTIVE HEART FAILURE: ICD-10-CM

## 2023-03-08 DIAGNOSIS — R25.2 MUSCLE CRAMPS: ICD-10-CM

## 2023-03-08 PROCEDURE — 99214 OFFICE O/P EST MOD 30 MIN: CPT | Performed by: FAMILY MEDICINE

## 2023-03-08 PROCEDURE — 36415 COLL VENOUS BLD VENIPUNCTURE: CPT | Performed by: FAMILY MEDICINE

## 2023-03-08 RX ORDER — ALBUTEROL SULFATE 1.25 MG/3ML
1 SOLUTION RESPIRATORY (INHALATION) 3 TIMES DAILY PRN
Qty: 4 EACH | Refills: 3 | Status: SHIPPED | OUTPATIENT
Start: 2023-03-08

## 2023-03-08 RX ORDER — FLUCONAZOLE 150 MG/1
150 TABLET ORAL EVERY OTHER DAY
Qty: 2 TABLET | Refills: 0 | Status: SHIPPED | OUTPATIENT
Start: 2023-03-08

## 2023-03-08 RX ORDER — BACLOFEN 10 MG/1
10 TABLET ORAL 3 TIMES DAILY
Qty: 40 TABLET | Refills: 1 | Status: SHIPPED | OUTPATIENT
Start: 2023-03-08

## 2023-03-08 RX ORDER — ALPRAZOLAM 1 MG/1
1 TABLET ORAL 2 TIMES DAILY PRN
Qty: 60 TABLET | Refills: 2 | Status: SHIPPED | OUTPATIENT
Start: 2023-03-08

## 2023-03-08 RX ORDER — ONDANSETRON HYDROCHLORIDE 8 MG/1
8 TABLET, FILM COATED ORAL EVERY 8 HOURS PRN
Qty: 15 TABLET | Refills: 0 | Status: SHIPPED | OUTPATIENT
Start: 2023-03-08

## 2023-03-08 RX ORDER — FUROSEMIDE 20 MG/1
20 TABLET ORAL DAILY
Qty: 30 TABLET | Refills: 2 | Status: SHIPPED | OUTPATIENT
Start: 2023-03-08

## 2023-03-08 RX ORDER — ALPRAZOLAM 1 MG/1
1 TABLET ORAL 2 TIMES DAILY PRN
Qty: 60 TABLET | Refills: 2 | Status: CANCELLED | OUTPATIENT
Start: 2023-03-08

## 2023-03-09 ENCOUNTER — TELEPHONE (OUTPATIENT)
Dept: FAMILY MEDICINE CLINIC | Facility: CLINIC | Age: 68
End: 2023-03-09
Payer: MEDICARE

## 2023-03-09 LAB
ALBUMIN SERPL-MCNC: 3.9 G/DL (ref 3.8–4.8)
ALBUMIN/GLOB SERPL: 1.9 {RATIO} (ref 1.2–2.2)
ALP SERPL-CCNC: 85 IU/L (ref 44–121)
ALT SERPL-CCNC: 21 IU/L (ref 0–32)
AST SERPL-CCNC: 20 IU/L (ref 0–40)
BILIRUB SERPL-MCNC: 0.4 MG/DL (ref 0–1.2)
BUN SERPL-MCNC: 20 MG/DL (ref 8–27)
BUN/CREAT SERPL: 20 (ref 12–28)
CALCIUM SERPL-MCNC: 9.2 MG/DL (ref 8.7–10.3)
CHLORIDE SERPL-SCNC: 95 MMOL/L (ref 96–106)
CO2 SERPL-SCNC: 32 MMOL/L (ref 20–29)
CREAT SERPL-MCNC: 1 MG/DL (ref 0.57–1)
EGFRCR SERPLBLD CKD-EPI 2021: 61 ML/MIN/1.73
GLOBULIN SER CALC-MCNC: 2.1 G/DL (ref 1.5–4.5)
GLUCOSE SERPL-MCNC: 195 MG/DL (ref 70–99)
POTASSIUM SERPL-SCNC: 4.1 MMOL/L (ref 3.5–5.2)
PROT SERPL-MCNC: 6 G/DL (ref 6–8.5)
SODIUM SERPL-SCNC: 140 MMOL/L (ref 134–144)

## 2023-03-09 NOTE — TELEPHONE ENCOUNTER
RACHELE WITH [facility] CALLED BACK TODAY TO ASK FOR HOME HEALTH ORDER FOR THE NURSE TO VISIT HER ONCE A WEEK.  SINCE THEY DON'T HAVE AN ORDER, THEY CANNOT SEE HER NOW.

## 2023-03-09 NOTE — PROGRESS NOTES
Follow Up Office Visit      Date of Visit:  2023   Patient Name: Radha Aldana  : 1955   MRN: [id number]     Chief Complaint:    Chief Complaint   Patient presents with   • Hospital Follow Up Visit   • Med Refill       History of Present Illness: Radha Aldana is a 68 y.o. female who is here today for follow up.  Patient here for hospital follow-up.  Patient admitted for diastolic heart failure.  Overall doing better now.  Having some muscle cramps due to Lasix.  Need to recheck labs today.  Patient also needs refills on medication.        Subjective      Review of Systems:   Review of Systems   Constitutional: Negative for fatigue and fever.   HENT: Negative for congestion and ear pain.    Respiratory: Negative for apnea, cough, chest tightness and shortness of breath.    Cardiovascular: Negative for chest pain.   Gastrointestinal: Negative for abdominal pain, constipation, diarrhea and nausea.   Musculoskeletal: Negative for arthralgias.   Psychiatric/Behavioral: Negative for depressed mood and stress.       Past Medical History:   Past Medical History:   Diagnosis Date   • Anxiety    • Chronic GERD    • Emphysema    • Essential hypertension    • Mixed hyperlipidemia        Past Surgical History: No past surgical history on file.    Family History:   Family History   Problem Relation Age of Onset   • Hypertension Father        Social History:   Social History     Socioeconomic History   • Marital status:    Tobacco Use   • Smoking status: Former     Packs/day: 1.00     Years: 55.00     Pack years: 55.00     Types: Cigarettes     Start date:      Quit date:      Years since quitting: 10.1   • Smokeless tobacco: Never   Vaping Use   • Vaping Use: Never used   Substance and Sexual Activity   • Alcohol use: Defer   • Drug use: Never   • Sexual activity: Defer       Medications:     Current Outpatient Medications:   •  albuterol (ACCUNEB) 1.25 MG/3ML nebulizer solution, Take 3 mL by  "nebulization 3 (Three) Times a Day As Needed for Wheezing. Disp 4 boxes., Disp: 4 each, Rfl: 3  •  ALPRAZolam (XANAX) 1 MG tablet, Take 1 tablet by mouth 2 (Two) Times a Day As Needed for Anxiety. for anxiety, Disp: 60 tablet, Rfl: 2  •  baclofen (LIORESAL) 10 MG tablet, Take 1 tablet by mouth 3 (Three) Times a Day., Disp: 40 tablet, Rfl: 1  •  fluconazole (Diflucan) 150 MG tablet, Take 1 tablet by mouth Every Other Day., Disp: 2 tablet, Rfl: 0  •  furosemide (Lasix) 20 MG tablet, Take 1 tablet by mouth Daily., Disp: 30 tablet, Rfl: 2  •  HYDROcodone-acetaminophen (NORCO) 5-325 MG per tablet, Take 1 tablet by mouth Every 6 (Six) Hours As Needed for Moderate Pain., Disp: 15 tablet, Rfl: 0  •  lisinopril (PRINIVIL,ZESTRIL) 10 MG tablet, Take 1 tablet by mouth Daily., Disp: 90 tablet, Rfl: 1  •  meloxicam (MOBIC) 7.5 MG tablet, Take 1 tablet by mouth Daily., Disp: 90 tablet, Rfl: 1  •  nystatin (MYCOSTATIN) 998472 UNIT/GM cream, APPLY  CREAM TOPICALLY TO AFFECTED AREA TWICE DAILY, Disp: 30 g, Rfl: 0  •  omeprazole (priLOSEC) 40 MG capsule, Take 1 capsule by mouth Daily. before a meal, Disp: 90 capsule, Rfl: 1  •  ondansetron (Zofran) 8 MG tablet, Take 1 tablet by mouth Every 8 (Eight) Hours As Needed for Nausea or Vomiting., Disp: 15 tablet, Rfl: 0  •  rosuvastatin (CRESTOR) 20 MG tablet, Take 1 tablet by mouth Daily., Disp: 90 tablet, Rfl: 1  •  Stiolto Respimat 2.5-2.5 MCG/ACT aerosol solution inhaler, Inhale 2 puffs Daily., Disp: , Rfl:   •  traZODone (DESYREL) 50 MG tablet, Take 1 tablet by mouth Every Night., Disp: 90 tablet, Rfl: 1    Allergies:   Allergies   Allergen Reactions   • Zocor [Simvastatin] Myalgia       Objective     Physical Exam:  Vital Signs:   Vitals:    03/08/23 1439   BP: 120/70   Pulse: 63   SpO2: 93%   Weight: 103 kg (227 lb)   Height: 157.5 cm (62\")     Body mass index is 41.52 kg/m².     Physical Exam  Vitals and nursing note reviewed.   Constitutional:       General: She is not in acute " distress.     Appearance: Normal appearance. She is not ill-appearing.   HENT:      Head: Normocephalic and atraumatic.      Right Ear: Tympanic membrane and ear canal normal.      Left Ear: Tympanic membrane and ear canal normal.      Nose: Nose normal.   Cardiovascular:      Rate and Rhythm: Normal rate and regular rhythm.      Heart sounds: Normal heart sounds.   Pulmonary:      Effort: Pulmonary effort is normal.      Breath sounds: Normal breath sounds.   Neurological:      Mental Status: She is alert and oriented to person, place, and time. Mental status is at baseline.   Psychiatric:         Mood and Affect: Mood normal.         Procedures      Assessment / Plan      Assessment/Plan:   Diagnoses and all orders for this visit:    1. High risk medication use (Primary)  -     Comprehensive metabolic panel; Future  -     Comprehensive metabolic panel    2. Anxiety  -     ALPRAZolam (XANAX) 1 MG tablet; Take 1 tablet by mouth 2 (Two) Times a Day As Needed for Anxiety. for anxiety  Dispense: 60 tablet; Refill: 2    Other orders  -     albuterol (ACCUNEB) 1.25 MG/3ML nebulizer solution; Take 3 mL by nebulization 3 (Three) Times a Day As Needed for Wheezing. Disp 4 boxes.  Dispense: 4 each; Refill: 3  -     ondansetron (Zofran) 8 MG tablet; Take 1 tablet by mouth Every 8 (Eight) Hours As Needed for Nausea or Vomiting.  Dispense: 15 tablet; Refill: 0  -     baclofen (LIORESAL) 10 MG tablet; Take 1 tablet by mouth 3 (Three) Times a Day.  Dispense: 40 tablet; Refill: 1  -     furosemide (Lasix) 20 MG tablet; Take 1 tablet by mouth Daily.  Dispense: 30 tablet; Refill: 2  -     fluconazole (Diflucan) 150 MG tablet; Take 1 tablet by mouth Every Other Day.  Dispense: 2 tablet; Refill: 0         Patient seems to be breathing better from her hospitalization.  Patient with oxygen dependent COPD.  Will check renal function.  On diuretic.  Having a lot of muscle cramps.  Make sure she stays hydrated.  Also refilled her current  anxiety medication.    Follow Up:   No follow-ups on file.    Wilfrid Victoria  [facility]

## 2023-03-09 NOTE — TELEPHONE ENCOUNTER
Caller: Radha Aldana    Relationship: Self    Best call back number:    [phone]      What test was performed:LAB WORK  When was the test performed: 03.09.23  Where was the test performed: IN OFFICE  Additional notes: CALL WITH RESULTS

## 2023-03-10 NOTE — TELEPHONE ENCOUNTER
Patient Active Problem List    Diagnosis Date Noted   â¢ Liver metastases (CMS/HCC) 07/29/2015     Priority: High   â¢ Encounter for antineoplastic chemotherapy 07/29/2015     Priority: High   â¢ Cancer of head of pancreas (CMS/HCC) 07/09/2015     Priority: High     Overview Note:     Multiple hepatic metastases. Primary encasing the celiac vessels. Elevated CA-19-9. Liver biopsy [facility] 7/22/15  8/3/15. Rx FOLFIRINOX  9/11/15: CT scan s/p 3C shows stable disease, however markers elevating  Rx: Gemcitabine plus nab paclitaxel, 9/15/15  AL on f/u scan 12/2/15 with falling CA-19-9   Summer 2016. Stable CA-19-9. Clinically doing well. Chemotherapy related cytopenias requiring dose reduction. Summer 2016. Stable CA-19-9. Chemotherapy regimen switched to every other week secondary to therapy related cytopenias  Fall 2016. Slow progression of CA-19-9 without radiologic evidence of progression in October 2016. May 2017. Progression of CA-19-9 (384). CT scan without obvious evidence of disease progression. Continue treatment with gemcitabine/nab paclitaxel every other week  7/2017 CA-19-9 trending down (191 on 7/5)  11/2017. Jaundiced, biliary obstruction secondary to malignancy, status post percutaneous stenting and drainage. Rx with liposomal irinotecan considered. CA 19-9 with dramatic elevation 422--> 2400     â¢ Anemia in neoplastic disease 07/05/2017     Priority: Medium   â¢ Diabetes mellitus type 2, uncomplicated (CMS/HCC)      Priority: Medium   â¢ Situational mixed anxiety and depressive disorder 11/08/2017     Priority: Low   â¢ Anemia due to antineoplastic chemotherapy 07/11/2017     Priority: Low   â¢ Port-a-cath in place 08/05/2015     Priority: Low   â¢ Chronic gastritis 05/14/2015     Priority: Low     Overview Note:     May 2015 EGD done for weight loss, and epigastric pain.   H. pylori positive     â¢ Vitamin D deficiency      Priority: Low   â¢ Hypercholesterolemia      Priority: Low   â¢ Essential hypertension, Pt contacted   "benign      Priority: Low       HISTORY OF PRESENT ILLNESS:  Ginger Cota and his wife return to the office in follow-up. He is feeling a little bit better. His appetite has picked up a little bit, his pain seems to be under adequate control. He reports that the color of his stools is returning to normal, denies any fevers, chills, night sweats. His weight is stable. PAST MEDICAL HISTORY AND PAST SURGICAL HISTORY:  Reviewed and interval changes as noted above. ALLERGIES AND DRUG INTOLERANCE:  Reviewed and interval changes as noted above    CURRENT MEDICATIONS:  Reviewed and interval changes as noted above    FAMILY HISTORY AND SOCIAL HISTORY:  Reviewed and interval changes as noted above. REVIEW OF SYSTEMS:  Per electronic medical record notes, reviewed and agreed upon. PHYSICAL EXAMINATION:  Oncology Encounter Vitals [12/18/17 1111]   ONC OP Encounter Vitals Group      BP 99/56      Pulse 83      Resp       Temp 97.4 Â°F (36.3 Â°C)      Temp src Temporal Art      SpO2       Weight 125 lb (56.7 kg)      Height 5' 6"" (1.676 m)      Pain Score  0      Pain Location       Pain Education? BSA (Calculated - m2) - [employer]ina [employer] Kristina 1.64      BMI (Calculated) 20.22       ECOG Performance Status   1 - No physically strenuous activity, but ambulatory and able to carry out light or sedentary work. General Appearance: 70year old male in no acute distress. Looks slightly improved compared to last visit  Psychiatric: Mood and affect are stable/improved, seems less anxious. Judgment and insight are appropriate. HEENT:  Head: Normocephalic, atraumatic. Mouth: No oral lesions. Lymphatic: No pathological adenopathy palpated. Cardiovascular:  No JVD(jugular venous distention). Heart had a regular rhythm and rate with no gallops rubs or murmurs noted. Respiratory:  Normal respiratory effort, bilateral and symmetric expansion. Lungs are clear to auscultation bilaterally.   Abdomen: No masses or ascites, soft and only " mildly tender to deep palpation, hepatomegaly seems stable. Normal bowel sounds, no rebound tenderness. Musculoskeletal: Extremities without clubbing, cyanosis or edema. No tenderness to palpation of the spine. Skin: Generalized paleness, icterus resolved. Neurologic: Normal gait and station. Alert and oriented times 3.     SIGNIFICANT LABORATORY AND RADIOLOGY DATA:  Lab Services on 12/18/2017   Component Date Value   â¢ WBC 12/18/2017 9.3    â¢ RBC 12/18/2017 2.72*   â¢ HGB 12/18/2017 8.3*   â¢ HCT 12/18/2017 24.4*   â¢ MCV 12/18/2017 89.7    â¢ MCH 12/18/2017 30.5    â¢ MCHC 12/18/2017 34.0    â¢ RDW-CV 12/18/2017 16.6*   â¢ PLT 12/18/2017 303    â¢ DIFF TYPE 12/18/2017 AUTOMATED DIFFERENTIAL    â¢ Neutrophil 12/18/2017 61    â¢ LYMPH 12/18/2017 27    â¢ MONO 12/18/2017 7    â¢ EOSIN 12/18/2017 4    â¢ BASO 12/18/2017 1    â¢ Absolute Neutrophil 12/18/2017 5.7    â¢ Absolute Lymph 12/18/2017 2.5    â¢ Absolute Mono 12/18/2017 0.7    â¢ Absolute Eos 12/18/2017 0.4    â¢ Absolute Baso 12/18/2017 0.1    â¢ Fasting Status 12/18/2017 UNK    â¢ Sodium 12/18/2017 135    â¢ Potassium 12/18/2017 4.3    â¢ Chloride 12/18/2017 101    â¢ Carbon Dioxide 12/18/2017 24    â¢ Anion Gap 12/18/2017 14    â¢ Glucose 12/18/2017 215*   â¢ BUN 12/18/2017 51*   â¢ Creatinine 12/18/2017 1.71*   â¢ GFR Estimate,  Am* 12/18/2017 48    â¢ GFR Estimate, Non Phylicia* 12/18/2017 41    â¢ BUN/Creatinine Ratio 12/18/2017 30*   â¢ CALCIUM 12/18/2017 8.6    â¢ TOTAL BILIRUBIN 12/18/2017 1.5*   â¢ AST/SGOT 12/18/2017 31    â¢ ALT/SGPT 12/18/2017 64    â¢ ALK PHOSPHATASE 12/18/2017 185*   â¢ TOTAL PROTEIN 12/18/2017 7.2    â¢ Albumin 12/18/2017 2.6*   â¢ GLOBULIN 12/18/2017 4.6*   â¢ A/G Ratio, Serum 12/18/2017 0.6*   â¢ LDH 12/18/2017 149    Lab Services on 12/12/2017   Component Date Value   â¢ WBC 12/12/2017 10.2    â¢ RBC 12/12/2017 2.70*   â¢ HGB 12/12/2017 8.1*   â¢ HCT 12/12/2017 24.6*   â¢ MCV 12/12/2017 91.1    â¢ MCH 12/12/2017 30.0    â¢ MCHC 12/12/2017 32.9    â¢ RDW-CV 12/12/2017 17.3*   â¢ PLT 12/12/2017 360    â¢ DIFF TYPE 12/12/2017 AUTOMATED DIFFERENTIAL    â¢ Neutrophil 12/12/2017 65    â¢ LYMPH 12/12/2017 22    â¢ MONO 12/12/2017 9    â¢ EOSIN 12/12/2017 3    â¢ BASO 12/12/2017 1    â¢ Absolute Neutrophil 12/12/2017 6.7    â¢ Absolute Lymph 12/12/2017 2.2    â¢ Absolute Mono 12/12/2017 0.9    â¢ Absolute Eos 12/12/2017 0.3    â¢ Absolute Baso 12/12/2017 0.1    â¢ Fasting Status 12/12/2017 UNK    â¢ Sodium 12/12/2017 130*   â¢ Potassium 12/12/2017 4.8    â¢ Chloride 12/12/2017 99    â¢ Carbon Dioxide 12/12/2017 20*   â¢ Anion Gap 12/12/2017 16    â¢ Glucose 12/12/2017 247*   â¢ BUN 12/12/2017 83*   â¢ Creatinine 12/12/2017 2.09*   â¢ GFR Estimate,  Am* 12/12/2017 37    â¢ GFR Estimate, Non Phylicia* 12/12/2017 32    â¢ BUN/Creatinine Ratio 12/12/2017 40*   â¢ CALCIUM 12/12/2017 8.6    â¢ TOTAL BILIRUBIN 12/12/2017 2.2*   â¢ AST/SGOT 12/12/2017 40*   â¢ ALT/SGPT 12/12/2017 101*   â¢ ALK PHOSPHATASE 12/12/2017 215*   â¢ TOTAL PROTEIN 12/12/2017 7.0    â¢ Albumin 12/12/2017 2.4*   â¢ GLOBULIN 12/12/2017 4.6*   â¢ A/G Ratio, Serum 12/12/2017 0.5*   â¢ LDH 12/12/2017 139    Office Visit on 12/08/2017   Component Date Value   â¢ VIA MED ONC PATHWAYS TAG 12/08/2017 [id number]    Lab Services on 12/08/2017   Component Date Value   â¢ Fasting Status 12/08/2017 UNK    â¢ Sodium 12/08/2017 133*   â¢ Potassium 12/08/2017 4.9    â¢ Chloride 12/08/2017 103    â¢ Carbon Dioxide 12/08/2017 19*   â¢ Anion Gap 12/08/2017 16    â¢ Glucose 12/08/2017 152*   â¢ BUN 12/08/2017 91*   â¢ Creatinine 12/08/2017 1.91*   â¢ GFR Estimate,  Am* 12/08/2017 42    â¢ GFR Estimate, Non Phylicia* 12/08/2017 36    â¢ BUN/Creatinine Ratio 12/08/2017 48*   â¢ CALCIUM 12/08/2017 8.7    â¢ TOTAL BILIRUBIN 12/08/2017 3.1*   â¢ AST/SGOT 12/08/2017 59*   â¢ ALT/SGPT 12/08/2017 166*   â¢ ALK PHOSPHATASE 12/08/2017 308*   â¢ TOTAL PROTEIN 12/08/2017 7.4    â¢ Albumin 12/08/2017 2.8*   â¢ GLOBULIN 12/08/2017 4.6*   â¢ A/G Ratio, Serum 12/08/2017 0.6*   Office Visit on 12/06/2017   Component Date Value   â¢ VIA MED ONC PATHWAYS TAG 12/07/2017 [id number]    Lab Services on 12/06/2017   Component Date Value   â¢ Fasting Status 12/06/2017 UNK    â¢ Sodium 12/06/2017 130*   â¢ Potassium 12/06/2017 4.7    â¢ Chloride 12/06/2017 95*   â¢ Carbon Dioxide 12/06/2017 22    â¢ Anion Gap 12/06/2017 18    â¢ Glucose 12/06/2017 221*   â¢ BUN 12/06/2017 129*   â¢ Creatinine 12/06/2017 2.79*   â¢ GFR Estimate,  Am* 12/06/2017 26    â¢ GFR Estimate, Non Phylicia* 12/06/2017 23    â¢ BUN/Creatinine Ratio 12/06/2017 46*   â¢ CALCIUM 12/06/2017 8.8    â¢ TOTAL BILIRUBIN 12/06/2017 4.1*   â¢ AST/SGOT 12/06/2017 59*   â¢ ALT/SGPT 12/06/2017 190*   â¢ ALK PHOSPHATASE 12/06/2017 357*   â¢ TOTAL PROTEIN 12/06/2017 7.8    â¢ Albumin 12/06/2017 2.8*   â¢ GLOBULIN 12/06/2017 5.0*   â¢ A/G Ratio, Serum 12/06/2017 0.6*   Lab Services on 12/05/2017   Component Date Value   â¢ WBC 12/05/2017 9.1    â¢ RBC 12/05/2017 3.45*   â¢ HGB 12/05/2017 10.1*   â¢ HCT 12/05/2017 30.7*   â¢ MCV 12/05/2017 89.0    â¢ MCH 12/05/2017 29.3    â¢ MCHC 12/05/2017 32.9    â¢ RDW-CV 12/05/2017 17.4*   â¢ PLT 12/05/2017 365    â¢ DIFF TYPE 12/05/2017 AUTOMATED DIFFERENTIAL    â¢ Neutrophil 12/05/2017 62    â¢ LYMPH 12/05/2017 23    â¢ MONO 12/05/2017 10    â¢ EOSIN 12/05/2017 4    â¢ BASO 12/05/2017 1    â¢ Absolute Neutrophil 12/05/2017 5.7    â¢ Absolute Lymph 12/05/2017 2.1    â¢ Absolute Mono 12/05/2017 0.9    â¢ Absolute Eos 12/05/2017 0.4    â¢ Absolute Baso 12/05/2017 0.1    â¢ Fasting Status 12/05/2017 UNK    â¢ Sodium 12/05/2017 128*   â¢ Potassium 12/05/2017 4.5    â¢ Chloride 12/05/2017 92*   â¢ Carbon Dioxide 12/05/2017 20*   â¢ Anion Gap 12/05/2017 21*   â¢ Glucose 12/05/2017 254*   â¢ BUN 12/05/2017 122*   â¢ Creatinine 12/05/2017 2.89*   â¢ GFR Estimate,  Am* 12/05/2017 25    â¢ GFR Estimate, Non Phylicia* 12/05/2017 22    â¢ BUN/Creatinine Ratio 12/05/2017 42*   â¢ CALCIUM 12/05/2017 8.9    â¢ TOTAL BILIRUBIN 12/05/2017 4.6*   â¢ AST/SGOT 12/05/2017 51*   â¢ ALT/SGPT 12/05/2017 197*   â¢ ALK PHOSPHATASE 12/05/2017 378*   â¢ TOTAL PROTEIN 12/05/2017 7.7    â¢ Albumin 12/05/2017 2.8*   â¢ GLOBULIN 12/05/2017 4.9*   â¢ A/G Ratio, Serum 12/05/2017 0.6*   â¢ LDH 12/05/2017 154    â¢ CANCER ANTIGEN GI 19-9 12/06/2017 2474*       CLINICAL IMPRESSION AND PLAN:  Metastatic pancreatic cancer. Clinically patient is slightly improved. We elected to continue close follow-up and hold off starting third line therapy with liposomal irinotecan. Patient will be seen next week in follow-up. He is okay with current supply of pain medication but will call us with any problems. He will receive IV fluids and IV antiemetics at the office as needed. He and his wife are in agreement and understanding current plan.

## 2023-03-10 NOTE — TELEPHONE ENCOUNTER
Kidney function and electrolytes are just fine.  No issues of concern.  Sugar was a bit up but she has been on steroids I think.

## 2023-03-23 ENCOUNTER — TELEPHONE (OUTPATIENT)
Dept: FAMILY MEDICINE CLINIC | Facility: CLINIC | Age: 68
End: 2023-03-23
Payer: MEDICARE

## 2023-03-23 NOTE — TELEPHONE ENCOUNTER
PHONE CALL FROM [facility].  REFAXED PLAN OF CARE ON Monday.  COULD SHE GET THIS BACK WITH DOCTOR'S SIGNATURE TODAY PLEASE.     PLEASE CALL CHENG @ [phone]

## 2023-03-25 ENCOUNTER — OUTSIDE FACILITY SERVICE (OUTPATIENT)
Dept: FAMILY MEDICINE CLINIC | Facility: CLINIC | Age: 68
End: 2023-03-25
Payer: MEDICARE

## 2023-03-27 ENCOUNTER — TELEPHONE (OUTPATIENT)
Dept: FAMILY MEDICINE CLINIC | Facility: CLINIC | Age: 68
End: 2023-03-27
Payer: MEDICARE

## 2023-03-27 NOTE — TELEPHONE ENCOUNTER
Caller: Radha Aldana    Relationship: Self    Best call back number: [phone]    What medications are you currently taking:   Current Outpatient Medications on File Prior to Visit   Medication Sig Dispense Refill   • albuterol (ACCUNEB) 1.25 MG/3ML nebulizer solution Take 3 mL by nebulization 3 (Three) Times a Day As Needed for Wheezing. Disp 4 boxes. 4 each 3   • ALPRAZolam (XANAX) 1 MG tablet Take 1 tablet by mouth 2 (Two) Times a Day As Needed for Anxiety. for anxiety 60 tablet 2   • baclofen (LIORESAL) 10 MG tablet Take 1 tablet by mouth 3 (Three) Times a Day. 40 tablet 1   • fluconazole (Diflucan) 150 MG tablet Take 1 tablet by mouth Every Other Day. 2 tablet 0   • furosemide (Lasix) 20 MG tablet Take 1 tablet by mouth Daily. 30 tablet 2   • HYDROcodone-acetaminophen (NORCO) 5-325 MG per tablet Take 1 tablet by mouth Every 6 (Six) Hours As Needed for Moderate Pain. 15 tablet 0   • lisinopril (PRINIVIL,ZESTRIL) 10 MG tablet Take 1 tablet by mouth Daily. 90 tablet 1   • meloxicam (MOBIC) 7.5 MG tablet Take 1 tablet by mouth Daily. 90 tablet 1   • nystatin (MYCOSTATIN) [id number] UNIT/GM cream APPLY  CREAM TOPICALLY TO AFFECTED AREA TWICE DAILY 30 g 0   • omeprazole (priLOSEC) 40 MG capsule Take 1 capsule by mouth Daily. before a meal 90 capsule 1   • ondansetron (Zofran) 8 MG tablet Take 1 tablet by mouth Every 8 (Eight) Hours As Needed for Nausea or Vomiting. 15 tablet 0   • rosuvastatin (CRESTOR) 20 MG tablet Take 1 tablet by mouth Daily. 90 tablet 1   • Stiolto Respimat 2.5-2.5 MCG/ACT aerosol solution inhaler Inhale 2 puffs Daily.     • traZODone (DESYREL) 50 MG tablet Take 1 tablet by mouth Every Night. 90 tablet 1     No current facility-administered medications on file prior to visit.          When did you start taking these medications:  HAS TAKEN FOR YEARS   Which medication are you concerned about:   lisinopril (PRINIVIL,ZESTRIL) 10 MG tablet Take 1 tablet by mouth Daily.       Who prescribed you  this medication: DR GOMEZ     What are your concerns: THE PATIENT REPORTS SHE WENT INTO THE [facility] A4 WEEK AGO ND REPORTS THAT HER LISINOPRIL WAS RAISED FROM 10 MG ONCE PER PAY DAY TO 20 MG ONCE PER DAY     How long have you had these concerns:  4 WEEKS AGO - PLEASE CALL THE PATIENT AND ADVISE     REPORTS HER CURRENT BLOOD PRESSURE /67 - WARMED TRANSFERRED BECAUSE OF PULSE  (DECLINED ER FOR RAPID PULSE)

## 2023-03-27 NOTE — TELEPHONE ENCOUNTER
Patient said she just needed her lisinopril prescription updated to 20mg daily instead of 10mg. Using [facility] in [place].

## 2023-03-28 RX ORDER — LISINOPRIL 20 MG/1
20 TABLET ORAL DAILY
Qty: 90 TABLET | Refills: 1 | Status: SHIPPED | OUTPATIENT
Start: 2023-03-28 | End: 2023-09-24

## 2023-06-12 ENCOUNTER — OFFICE VISIT (OUTPATIENT)
Dept: FAMILY MEDICINE CLINIC | Facility: CLINIC | Age: 68
End: 2023-06-12
Payer: MEDICARE

## 2023-06-12 VITALS
WEIGHT: 231 LBS | DIASTOLIC BLOOD PRESSURE: 60 MMHG | HEART RATE: 111 BPM | SYSTOLIC BLOOD PRESSURE: 130 MMHG | HEIGHT: 62 IN | BODY MASS INDEX: 42.51 KG/M2 | OXYGEN SATURATION: 90 %

## 2023-06-12 DIAGNOSIS — I10 PRIMARY HYPERTENSION: ICD-10-CM

## 2023-06-12 DIAGNOSIS — R25.2 MUSCLE CRAMPS: ICD-10-CM

## 2023-06-12 DIAGNOSIS — F41.9 ANXIETY: Primary | ICD-10-CM

## 2023-06-12 DIAGNOSIS — M25.551 RIGHT HIP PAIN: ICD-10-CM

## 2023-06-12 DIAGNOSIS — I50.31 ACUTE DIASTOLIC CONGESTIVE HEART FAILURE: ICD-10-CM

## 2023-06-12 DIAGNOSIS — J44.9 CHRONIC OBSTRUCTIVE PULMONARY DISEASE, UNSPECIFIED COPD TYPE: ICD-10-CM

## 2023-06-12 RX ORDER — METHYLPREDNISOLONE 4 MG/1
TABLET ORAL
Qty: 21 TABLET | Refills: 0 | Status: SHIPPED | OUTPATIENT
Start: 2023-06-12

## 2023-06-12 RX ORDER — BACLOFEN 10 MG/1
10 TABLET ORAL 3 TIMES DAILY
Qty: 40 TABLET | Refills: 1 | Status: SHIPPED | OUTPATIENT
Start: 2023-06-12

## 2023-06-12 RX ORDER — POTASSIUM CHLORIDE 750 MG/1
10 TABLET, FILM COATED, EXTENDED RELEASE ORAL DAILY
Qty: 90 TABLET | Refills: 1 | Status: SHIPPED | OUTPATIENT
Start: 2023-06-12

## 2023-06-12 RX ORDER — AZITHROMYCIN 250 MG/1
TABLET, FILM COATED ORAL
Qty: 6 TABLET | Refills: 0 | Status: SHIPPED | OUTPATIENT
Start: 2023-06-12

## 2023-06-12 RX ORDER — ALPRAZOLAM 1 MG/1
1 TABLET ORAL 2 TIMES DAILY PRN
Qty: 60 TABLET | Refills: 2 | Status: SHIPPED | OUTPATIENT
Start: 2023-06-12

## 2023-06-12 RX ORDER — ROSUVASTATIN CALCIUM 20 MG/1
20 TABLET, COATED ORAL DAILY
Qty: 90 TABLET | Refills: 1 | Status: SHIPPED | OUTPATIENT
Start: 2023-06-12

## 2023-06-12 RX ORDER — OMEPRAZOLE 40 MG/1
40 CAPSULE, DELAYED RELEASE ORAL DAILY
Qty: 90 CAPSULE | Refills: 1 | Status: SHIPPED | OUTPATIENT
Start: 2023-06-12

## 2023-06-12 RX ORDER — TRAMADOL HYDROCHLORIDE 50 MG/1
50 TABLET ORAL EVERY 8 HOURS PRN
Qty: 90 TABLET | Refills: 2 | Status: SHIPPED | OUTPATIENT
Start: 2023-06-12

## 2023-06-12 RX ORDER — FUROSEMIDE 40 MG/1
40 TABLET ORAL DAILY
Qty: 90 TABLET | Refills: 1 | Status: SHIPPED | OUTPATIENT
Start: 2023-06-12

## 2023-06-13 LAB
ALBUMIN SERPL-MCNC: 3.7 G/DL (ref 3.8–4.8)
ALBUMIN/GLOB SERPL: 1.5 {RATIO} (ref 1.2–2.2)
ALP SERPL-CCNC: 87 IU/L (ref 44–121)
ALT SERPL-CCNC: 17 IU/L (ref 0–32)
AST SERPL-CCNC: 23 IU/L (ref 0–40)
BILIRUB SERPL-MCNC: 0.2 MG/DL (ref 0–1.2)
BUN SERPL-MCNC: 13 MG/DL (ref 8–27)
BUN/CREAT SERPL: 12 (ref 12–28)
CALCIUM SERPL-MCNC: 8.8 MG/DL (ref 8.7–10.3)
CHLORIDE SERPL-SCNC: 100 MMOL/L (ref 96–106)
CO2 SERPL-SCNC: 34 MMOL/L (ref 20–29)
CREAT SERPL-MCNC: 1.08 MG/DL (ref 0.57–1)
EGFRCR SERPLBLD CKD-EPI 2021: 55 ML/MIN/1.73
GLOBULIN SER CALC-MCNC: 2.5 G/DL (ref 1.5–4.5)
GLUCOSE SERPL-MCNC: 138 MG/DL (ref 70–99)
POTASSIUM SERPL-SCNC: 4 MMOL/L (ref 3.5–5.2)
PROT SERPL-MCNC: 6.2 G/DL (ref 6–8.5)
SODIUM SERPL-SCNC: 145 MMOL/L (ref 134–144)

## 2023-06-13 NOTE — PROGRESS NOTES
Follow Up Office Visit      Date of Visit:  2023   Patient Name: Radha Aldana  : 1955   MRN: [id number]     Chief Complaint:    Chief Complaint   Patient presents with    COPD       History of Present Illness: Radha Aldana is a 68 y.o. female who is here today for follow up.  Patient seen predominantly for anxiety and COPD.  Anxiety is fairly stable on chronic medication.  Currently takes Xanax as needed.  Carlos report appropriate.  Patient taking medication as directed.  She needs refills.  Patient also takes chronic medication for her COPD.  She is oxygen dependent.  Patient also sees pulmonology.  Would like a refill on the steroid and antibiotic that she keeps around for as needed when spells get exacerbated before she can get to the doctor's office.  Patient also needs refills on hypertension medication.  She is also having right-sided hip pain that has worsened.  Related to arthritis.        Subjective      Review of Systems:   Review of Systems   Constitutional:  Negative for fatigue and fever.   HENT:  Negative for congestion and ear pain.    Respiratory:  Positive for shortness of breath. Negative for apnea, cough and chest tightness.    Cardiovascular:  Negative for chest pain.   Gastrointestinal:  Negative for abdominal pain, constipation, diarrhea and nausea.   Musculoskeletal:  Positive for arthralgias.   Psychiatric/Behavioral:  Negative for depressed mood and stress.      Past Medical History:   Past Medical History:   Diagnosis Date    Anxiety     Chronic GERD     Emphysema     Essential hypertension     Mixed hyperlipidemia        Past Surgical History: No past surgical history on file.    Family History:   Family History   Problem Relation Age of Onset    Hypertension Father        Social History:   Social History     Socioeconomic History    Marital status:    Tobacco Use    Smoking status: Former     Packs/day: 1.00     Years: 55.00     Pack years: 55.00     Types:  Cigarettes     Start date: 1958     Quit date: 2013     Years since quitting: 10.4    Smokeless tobacco: Never   Vaping Use    Vaping Use: Never used   Substance and Sexual Activity    Alcohol use: Defer    Drug use: Never    Sexual activity: Defer       Medications:     Current Outpatient Medications:     ALPRAZolam (XANAX) 1 MG tablet, Take 1 tablet by mouth 2 (Two) Times a Day As Needed for Anxiety. for anxiety, Disp: 60 tablet, Rfl: 2    baclofen (LIORESAL) 10 MG tablet, Take 1 tablet by mouth 3 (Three) Times a Day., Disp: 40 tablet, Rfl: 1    furosemide (Lasix) 40 MG tablet, Take 1 tablet by mouth Daily., Disp: 90 tablet, Rfl: 1    omeprazole (priLOSEC) 40 MG capsule, Take 1 capsule by mouth Daily. before a meal, Disp: 90 capsule, Rfl: 1    rosuvastatin (CRESTOR) 20 MG tablet, Take 1 tablet by mouth Daily., Disp: 90 tablet, Rfl: 1    albuterol (ACCUNEB) 1.25 MG/3ML nebulizer solution, Take 3 mL by nebulization 3 (Three) Times a Day As Needed for Wheezing. Disp 4 boxes., Disp: 4 each, Rfl: 3    azithromycin (Zithromax Z-Bernardino) 250 MG tablet, Take 2 tablets by mouth on day 1, then 1 tablet daily on days 2-5, Disp: 6 tablet, Rfl: 0    lisinopril (PRINIVIL,ZESTRIL) 20 MG tablet, Take 1 tablet by mouth Daily for 180 days., Disp: 90 tablet, Rfl: 1    methylPREDNISolone (MEDROL) 4 MG dose pack, Take as directed on package instructions., Disp: 21 tablet, Rfl: 0    ondansetron (Zofran) 8 MG tablet, Take 1 tablet by mouth Every 8 (Eight) Hours As Needed for Nausea or Vomiting., Disp: 15 tablet, Rfl: 0    potassium chloride 10 MEQ CR tablet, Take 1 tablet by mouth Daily., Disp: 90 tablet, Rfl: 1    Stiolto Respimat 2.5-2.5 MCG/ACT aerosol solution inhaler, Inhale 2 puffs Daily., Disp: , Rfl:     traMADol (ULTRAM) 50 MG tablet, Take 1 tablet by mouth Every 8 (Eight) Hours As Needed for Moderate Pain., Disp: 90 tablet, Rfl: 2    Allergies:   Allergies   Allergen Reactions    Zocor [Simvastatin] Myalgia       Objective  "    Physical Exam:  Vital Signs:   Vitals:    06/12/23 1424   BP: 130/60   Pulse: 111   SpO2: 90%   Weight: 105 kg (231 lb)   Height: 157.5 cm (62\")     Body mass index is 42.25 kg/m².     Physical Exam  Vitals and nursing note reviewed.   Constitutional:       General: She is not in acute distress.     Appearance: Normal appearance. She is not ill-appearing.   HENT:      Head: Normocephalic and atraumatic.      Right Ear: Tympanic membrane and ear canal normal.      Left Ear: Tympanic membrane and ear canal normal.      Nose: Nose normal.   Cardiovascular:      Rate and Rhythm: Normal rate and regular rhythm.      Heart sounds: Normal heart sounds.   Pulmonary:      Effort: Pulmonary effort is normal.      Breath sounds: Normal breath sounds.   Neurological:      Mental Status: She is alert and oriented to person, place, and time. Mental status is at baseline.   Psychiatric:         Mood and Affect: Mood normal.       Procedures      Assessment / Plan      Assessment/Plan:   Diagnoses and all orders for this visit:    1. Anxiety (Primary)  -     ALPRAZolam (XANAX) 1 MG tablet; Take 1 tablet by mouth 2 (Two) Times a Day As Needed for Anxiety. for anxiety  Dispense: 60 tablet; Refill: 2    2. Acute diastolic congestive heart failure  -     furosemide (Lasix) 40 MG tablet; Take 1 tablet by mouth Daily.  Dispense: 90 tablet; Refill: 1    3. Muscle cramps  -     baclofen (LIORESAL) 10 MG tablet; Take 1 tablet by mouth 3 (Three) Times a Day.  Dispense: 40 tablet; Refill: 1    4. Primary hypertension  -     Comprehensive Metabolic Panel    5. Right hip pain  -     traMADol (ULTRAM) 50 MG tablet; Take 1 tablet by mouth Every 8 (Eight) Hours As Needed for Moderate Pain.  Dispense: 90 tablet; Refill: 2    Other orders  -     potassium chloride 10 MEQ CR tablet; Take 1 tablet by mouth Daily.  Dispense: 90 tablet; Refill: 1  -     methylPREDNISolone (MEDROL) 4 MG dose pack; Take as directed on package instructions.  Dispense: " 21 tablet; Refill: 0  -     rosuvastatin (CRESTOR) 20 MG tablet; Take 1 tablet by mouth Daily.  Dispense: 90 tablet; Refill: 1  -     omeprazole (priLOSEC) 40 MG capsule; Take 1 capsule by mouth Daily. before a meal  Dispense: 90 capsule; Refill: 1  -     azithromycin (Zithromax Z-Bernardino) 250 MG tablet; Take 2 tablets by mouth on day 1, then 1 tablet daily on days 2-5  Dispense: 6 tablet; Refill: 0         Refilled medication for anxiety.  Gave steroid and antibiotics to keep on hand because of the COPD.  Continue current medications for her congestive heart failure muscle cramps hypertension.    Follow Up:   No follow-ups on file.    Wilfrid Victoria  [facility] [place]

## 2023-09-12 ENCOUNTER — OFFICE VISIT (OUTPATIENT)
Dept: FAMILY MEDICINE CLINIC | Facility: CLINIC | Age: 68
End: 2023-09-12
Payer: MEDICARE

## 2023-09-12 VITALS
OXYGEN SATURATION: 99 % | BODY MASS INDEX: 40.7 KG/M2 | DIASTOLIC BLOOD PRESSURE: 80 MMHG | WEIGHT: 221.19 LBS | HEART RATE: 79 BPM | SYSTOLIC BLOOD PRESSURE: 124 MMHG | HEIGHT: 62 IN

## 2023-09-12 DIAGNOSIS — D64.9 ANEMIA, UNSPECIFIED TYPE: Primary | ICD-10-CM

## 2023-09-12 DIAGNOSIS — F41.9 ANXIETY: ICD-10-CM

## 2023-09-12 DIAGNOSIS — J44.9 CHRONIC OBSTRUCTIVE PULMONARY DISEASE, UNSPECIFIED COPD TYPE: ICD-10-CM

## 2023-09-12 DIAGNOSIS — B37.9 YEAST INFECTION: ICD-10-CM

## 2023-09-12 DIAGNOSIS — M25.551 RIGHT HIP PAIN: ICD-10-CM

## 2023-09-12 DIAGNOSIS — K21.9 GASTROESOPHAGEAL REFLUX DISEASE WITHOUT ESOPHAGITIS: ICD-10-CM

## 2023-09-12 PROCEDURE — 99214 OFFICE O/P EST MOD 30 MIN: CPT | Performed by: FAMILY MEDICINE

## 2023-09-12 RX ORDER — PANTOPRAZOLE SODIUM 40 MG/1
1 TABLET, DELAYED RELEASE ORAL EVERY 12 HOURS SCHEDULED
COMMUNITY
Start: 2023-09-08

## 2023-09-12 RX ORDER — NYSTATIN 100000 U/G
OINTMENT TOPICAL
COMMUNITY
Start: 2023-09-08

## 2023-09-12 RX ORDER — ALBUTEROL SULFATE 1.25 MG/3ML
1 SOLUTION RESPIRATORY (INHALATION) 3 TIMES DAILY PRN
Qty: 4 EACH | Refills: 3 | Status: SHIPPED | OUTPATIENT
Start: 2023-09-12

## 2023-09-12 RX ORDER — ALPRAZOLAM 1 MG/1
1 TABLET ORAL 2 TIMES DAILY PRN
Qty: 60 TABLET | Refills: 2 | Status: CANCELLED | OUTPATIENT
Start: 2023-09-12

## 2023-09-12 RX ORDER — TRAMADOL HYDROCHLORIDE 50 MG/1
50 TABLET ORAL EVERY 8 HOURS PRN
Qty: 90 TABLET | Refills: 2 | Status: CANCELLED | OUTPATIENT
Start: 2023-09-12

## 2023-09-12 RX ORDER — TIOTROPIUM BROMIDE AND OLODATEROL 3.124; 2.736 UG/1; UG/1
2 SPRAY, METERED RESPIRATORY (INHALATION) DAILY
Qty: 3 EACH | Refills: 1 | Status: SHIPPED | OUTPATIENT
Start: 2023-09-12

## 2023-09-12 RX ORDER — FLUCONAZOLE 150 MG/1
150 TABLET ORAL DAILY
Qty: 1 TABLET | Refills: 1 | Status: SHIPPED | OUTPATIENT
Start: 2023-09-12

## 2023-09-12 RX ORDER — ALBUTEROL SULFATE 90 UG/1
2 AEROSOL, METERED RESPIRATORY (INHALATION) EVERY 4 HOURS PRN
Qty: 18 G | Refills: 5 | Status: SHIPPED | OUTPATIENT
Start: 2023-09-12

## 2023-09-12 RX ORDER — KETOCONAZOLE 20 MG/G
CREAM TOPICAL 2 TIMES DAILY
COMMUNITY
Start: 2023-07-25

## 2023-09-13 LAB
BASOPHILS # BLD AUTO: 0 X10E3/UL (ref 0–0.2)
BASOPHILS NFR BLD AUTO: 1 %
EOSINOPHIL # BLD AUTO: 0.2 X10E3/UL (ref 0–0.4)
EOSINOPHIL NFR BLD AUTO: 3 %
ERYTHROCYTE [DISTWIDTH] IN BLOOD BY AUTOMATED COUNT: 14.4 % (ref 11.7–15.4)
FERRITIN SERPL-MCNC: 9 NG/ML (ref 15–150)
HCT VFR BLD AUTO: 34 % (ref 34–46.6)
HGB BLD-MCNC: 10.5 G/DL (ref 11.1–15.9)
IMM GRANULOCYTES # BLD AUTO: 0 X10E3/UL (ref 0–0.1)
IMM GRANULOCYTES NFR BLD AUTO: 0 %
IRON SATN MFR SERPL: 7 % (ref 15–55)
IRON SERPL-MCNC: 27 UG/DL (ref 27–139)
LYMPHOCYTES # BLD AUTO: 1.7 X10E3/UL (ref 0.7–3.1)
LYMPHOCYTES NFR BLD AUTO: 28 %
MCH RBC QN AUTO: 26.3 PG (ref 26.6–33)
MCHC RBC AUTO-ENTMCNC: 30.9 G/DL (ref 31.5–35.7)
MCV RBC AUTO: 85 FL (ref 79–97)
MONOCYTES # BLD AUTO: 0.6 X10E3/UL (ref 0.1–0.9)
MONOCYTES NFR BLD AUTO: 10 %
NEUTROPHILS # BLD AUTO: 3.6 X10E3/UL (ref 1.4–7)
NEUTROPHILS NFR BLD AUTO: 58 %
PLATELET # BLD AUTO: 242 X10E3/UL (ref 150–450)
RBC # BLD AUTO: 4 X10E6/UL (ref 3.77–5.28)
TIBC SERPL-MCNC: 396 UG/DL (ref 250–450)
UIBC SERPL-MCNC: 369 UG/DL (ref 118–369)
VIT B12 SERPL-MCNC: 360 PG/ML (ref 232–1245)
WBC # BLD AUTO: 6.2 X10E3/UL (ref 3.4–10.8)

## 2023-09-13 NOTE — PROGRESS NOTES
Follow Up Office Visit      Date of Visit:  2023   Patient Name: Radha Aldana  : 1955   MRN: [id number]     Chief Complaint:    Chief Complaint   Patient presents with    three month followup       History of Present Illness: Radha Aldana is a 68 y.o. female who is here today for follow up.  Patient comes in today for recheck on anxiety.  Patient seen and stable.  Anxiety controlled on current medication.  She does need medication refills.  Patient also with anemia recently found and has upper GI scheduled very soon.  Unsure of etiology.  Need to have further evaluation today.  Needs refills also on COPD medication.  Patient also currently with yeast infection.        Subjective      Review of Systems:   Review of Systems   Constitutional:  Negative for fatigue and fever.   HENT:  Negative for congestion and ear pain.    Respiratory:  Negative for apnea, cough, chest tightness and shortness of breath.    Cardiovascular:  Negative for chest pain.   Gastrointestinal:  Negative for abdominal pain, constipation, diarrhea and nausea.   Musculoskeletal:  Negative for arthralgias.   Psychiatric/Behavioral:  Negative for depressed mood and stress.      Past Medical History:   Past Medical History:   Diagnosis Date    Anxiety     Chronic GERD     Emphysema     Essential hypertension     Mixed hyperlipidemia        Past Surgical History: History reviewed. No pertinent surgical history.    Family History:   Family History   Problem Relation Age of Onset    Hypertension Father        Social History:   Social History     Socioeconomic History    Marital status:    Tobacco Use    Smoking status: Former     Packs/day: 1.00     Years: 55.00     Pack years: 55.00     Types: Cigarettes     Start date:      Quit date:      Years since quitting: 10.7    Smokeless tobacco: Never   Vaping Use    Vaping Use: Never used   Substance and Sexual Activity    Alcohol use: Defer    Drug use: Never    Sexual  activity: Defer       Medications:     Current Outpatient Medications:     albuterol (ACCUNEB) 1.25 MG/3ML nebulizer solution, Take 3 mL by nebulization 3 (Three) Times a Day As Needed for Wheezing. Disp 4 boxes., Disp: 4 each, Rfl: 3    ALPRAZolam (XANAX) 1 MG tablet, Take 1 tablet by mouth 2 (Two) Times a Day As Needed for Anxiety. for anxiety, Disp: 60 tablet, Rfl: 2    fluorouracil (EFUDEX) 5 % cream, Apply 1 application  topically to the appropriate area as directed 2 (Two) Times a Day., Disp: , Rfl:     furosemide (Lasix) 40 MG tablet, Take 1 tablet by mouth Daily., Disp: 90 tablet, Rfl: 1    ketoconazole (NIZORAL) 2 % cream, Apply  topically to the appropriate area as directed 2 (Two) Times a Day., Disp: , Rfl:     levocetirizine (XYZAL) 5 MG tablet, Take 1 tablet by mouth Every Evening., Disp: , Rfl:     metoprolol succinate XL (TOPROL-XL) 50 MG 24 hr tablet, Take 1 tablet by mouth Daily., Disp: , Rfl:     mupirocin (BACTROBAN) 2 % ointment, Apply 1 application  topically to the appropriate area as directed 3 (Three) Times a Day., Disp: 22 g, Rfl: 1    nystatin (MYCOSTATIN) 044416 UNIT/GM ointment, APPLY A THIN LAYER TO CORNERS OF MOUTH TWICE DAILY, Disp: , Rfl:     ondansetron (Zofran) 8 MG tablet, Take 1 tablet by mouth Every 8 (Eight) Hours As Needed for Nausea or Vomiting., Disp: 15 tablet, Rfl: 0    pantoprazole (PROTONIX) 40 MG EC tablet, Take 1 tablet by mouth Every 12 (Twelve) Hours., Disp: , Rfl:     potassium chloride 10 MEQ CR tablet, Take 1 tablet by mouth Daily., Disp: 90 tablet, Rfl: 1    rosuvastatin (CRESTOR) 20 MG tablet, Take 1 tablet by mouth Daily., Disp: 90 tablet, Rfl: 1    Stiolto Respimat 2.5-2.5 MCG/ACT aerosol solution inhaler, Inhale 2 puffs Daily., Disp: 3 each, Rfl: 1    traMADol (ULTRAM) 50 MG tablet, Take 1 tablet by mouth Every 8 (Eight) Hours As Needed for Moderate Pain., Disp: 90 tablet, Rfl: 2    albuterol sulfate  (90 Base) MCG/ACT inhaler, Inhale 2 puffs Every 4  "(Four) Hours As Needed for Wheezing., Disp: 18 g, Rfl: 5    fluconazole (Diflucan) 150 MG tablet, Take 1 tablet by mouth Daily., Disp: 1 tablet, Rfl: 1    Allergies:   Allergies   Allergen Reactions    Atorvastatin Unknown (See Comments)    Sertraline Unknown - High Severity    Zocor [Simvastatin] Myalgia       Objective     Physical Exam:  Vital Signs:   Vitals:    09/12/23 1536   BP: 124/80   Pulse: 79   SpO2: 99%   Weight: 100 kg (221 lb 3 oz)   Height: 157.5 cm (62\")     Body mass index is 40.46 kg/m².     Physical Exam  Vitals and nursing note reviewed.   Constitutional:       General: She is not in acute distress.     Appearance: Normal appearance. She is not ill-appearing.   HENT:      Head: Normocephalic and atraumatic.      Right Ear: Tympanic membrane and ear canal normal.      Left Ear: Tympanic membrane and ear canal normal.      Nose: Nose normal.   Cardiovascular:      Rate and Rhythm: Normal rate and regular rhythm.      Heart sounds: Normal heart sounds.   Pulmonary:      Effort: Pulmonary effort is normal.      Breath sounds: Normal breath sounds.   Neurological:      Mental Status: She is alert and oriented to person, place, and time. Mental status is at baseline.   Psychiatric:         Mood and Affect: Mood normal.       Procedures      Assessment / Plan      Assessment/Plan:   Diagnoses and all orders for this visit:    1. Anemia, unspecified type (Primary)  -     Cancel: CBC Auto Differential  -     Cancel: Vitamin B12; Future  -     Cancel: Iron and TIBC  -     Cancel: Ferritin  -     CBC Auto Differential  -     Ferritin  -     Iron and TIBC  -     Cancel: Vitamin B12; Future  -     Vitamin B12    2. Right hip pain    3. Anxiety    4. Chronic obstructive pulmonary disease, unspecified COPD type  -     albuterol sulfate  (90 Base) MCG/ACT inhaler; Inhale 2 puffs Every 4 (Four) Hours As Needed for Wheezing.  Dispense: 18 g; Refill: 5  -     albuterol (ACCUNEB) 1.25 MG/3ML nebulizer " solution; Take 3 mL by nebulization 3 (Three) Times a Day As Needed for Wheezing. Disp 4 boxes.  Dispense: 4 each; Refill: 3  -     Stiolto Respimat 2.5-2.5 MCG/ACT aerosol solution inhaler; Inhale 2 puffs Daily.  Dispense: 3 each; Refill: 1    5. Gastroesophageal reflux disease without esophagitis    6. Yeast infection  -     fluconazole (Diflucan) 150 MG tablet; Take 1 tablet by mouth Daily.  Dispense: 1 tablet; Refill: 1         Check evaluation for the anemia.  Checking labs.  Refilled medications for anxiety and COPD.  Continue to see GI specialist.  Gave yeast medication.    Follow Up:   No follow-ups on file.    Wilfrid Victoria  [facility] [place]

## 2023-09-14 ENCOUNTER — TELEPHONE (OUTPATIENT)
Dept: FAMILY MEDICINE CLINIC | Facility: CLINIC | Age: 68
End: 2023-09-14

## 2023-09-14 DIAGNOSIS — D50.9 IRON DEFICIENCY ANEMIA, UNSPECIFIED IRON DEFICIENCY ANEMIA TYPE: Primary | ICD-10-CM

## 2023-09-14 NOTE — TELEPHONE ENCOUNTER
Caller: Radha Aldana    Relationship: Self    Best call back number: [phone]    What test was performed: LABS     When was the test performed: 9/12/23    Where was the test performed: IN OFFICE

## 2023-09-18 NOTE — TELEPHONE ENCOUNTER
Mild anemia.  Iron was low.  Ok to start iron but she needs to see GI.   I think this is already set up

## 2023-09-19 NOTE — TELEPHONE ENCOUNTER
HUB TO READ    Vm not set up, not able to lvm.   Mild anemia. Iron was low. Ok to start iron but she needs to see GI. I think this is already set up

## 2023-09-20 DIAGNOSIS — F41.9 ANXIETY: ICD-10-CM

## 2023-09-21 RX ORDER — ALPRAZOLAM 1 MG/1
TABLET ORAL
Qty: 60 TABLET | Refills: 0 | Status: SHIPPED | OUTPATIENT
Start: 2023-09-21

## 2023-09-22 NOTE — TELEPHONE ENCOUNTER
Name: Radha Aldana  Relationship: Self  Best Callback Number: [phone]  HUB PROVIDED THE RELAY MESSAGE FROM THE OFFICE  PATIENT   ADDITIONAL INFORMATION:    HUB TO READ: Mild anemia. Iron was low. Ok to start iron but she needs to see GI. I think this is already set up           CALL TO ADVISE ABOUT GI APPT

## 2023-09-22 NOTE — TELEPHONE ENCOUNTER
LVM for pt to call back.   HUB TO READ: Mild anemia. Iron was low. Ok to start iron but she needs to see GI. I think this is already set up

## 2023-10-22 DIAGNOSIS — F41.9 ANXIETY: ICD-10-CM

## 2023-10-23 DIAGNOSIS — F41.9 ANXIETY: ICD-10-CM

## 2023-10-23 RX ORDER — ALPRAZOLAM 1 MG/1
TABLET ORAL
Qty: 60 TABLET | Refills: 0 | Status: SHIPPED | OUTPATIENT
Start: 2023-10-23

## 2023-10-23 NOTE — TELEPHONE ENCOUNTER
Caller: Radha Aldana    Relationship: Self    Best call back number: [phone]     Requested Prescriptions:   Requested Prescriptions     Pending Prescriptions Disp Refills    ALPRAZolam (XANAX) 1 MG tablet 60 tablet 0     Sig: Take 1 tablet by mouth 2 (Two) Times a Day As Needed. for anxiety        Pharmacy where request should be sent: [address] [phone] [facility] [phone]      Last office visit with prescribing clinician: 9/12/2023   Last telemedicine visit with prescribing clinician: Visit date not found   Next office visit with prescribing clinician: 12/12/2023     Additional details provided by patient: PATIENT HAS ONE PILL LEFT      Does the patient have less than a 3 day supply:  [x] Yes  [] No    Would you like a call back once the refill request has been completed: [] Yes [x] No    If the office needs to give you a call back, can they leave a voicemail: [] Yes [x] No    Cadance Dunaway, RegSched Rep   10/23/23 12:56 EDT

## 2023-10-24 RX ORDER — ALPRAZOLAM 1 MG/1
1 TABLET ORAL 2 TIMES DAILY PRN
Qty: 60 TABLET | Refills: 0 | OUTPATIENT
Start: 2023-10-24

## 2023-11-17 RX ORDER — SODIUM, POTASSIUM,MAG SULFATES 17.5-3.13G
2 SOLUTION, RECONSTITUTED, ORAL ORAL TAKE AS DIRECTED
Qty: 354 ML | Refills: 0 | Status: SHIPPED | OUTPATIENT
Start: 2023-11-17

## 2023-11-22 DIAGNOSIS — F41.9 ANXIETY: ICD-10-CM

## 2023-11-23 RX ORDER — ALPRAZOLAM 1 MG/1
TABLET ORAL
Qty: 60 TABLET | Refills: 0 | Status: SHIPPED | OUTPATIENT
Start: 2023-11-23

## 2023-11-25 DIAGNOSIS — F41.9 ANXIETY: ICD-10-CM

## 2023-11-27 RX ORDER — ALPRAZOLAM 1 MG/1
TABLET ORAL
Qty: 60 TABLET | Refills: 0 | OUTPATIENT
Start: 2023-11-27

## 2023-11-30 ENCOUNTER — TELEPHONE (OUTPATIENT)
Dept: GASTROENTEROLOGY | Facility: CLINIC | Age: 68
End: 2023-11-30
Payer: MEDICARE

## 2023-11-30 NOTE — TELEPHONE ENCOUNTER
Pt started her prep too soon. Pt started at 5 am.    Pt was advised by ENRRIQUE Boone to take one bottle of mag citrate at 5 pm and the second bottle of her prep at 10 pm. If she is then not completely watery to take the 2nd bottle of mag citrate.

## 2023-12-01 ENCOUNTER — OUTSIDE FACILITY SERVICE (OUTPATIENT)
Dept: GASTROENTEROLOGY | Facility: CLINIC | Age: 68
End: 2023-12-01
Payer: MEDICARE

## 2023-12-01 ENCOUNTER — PREP FOR SURGERY (OUTPATIENT)
Dept: OTHER | Facility: HOSPITAL | Age: 68
End: 2023-12-01
Payer: MEDICARE

## 2023-12-01 DIAGNOSIS — Z99.81 O2 DEPENDENT: ICD-10-CM

## 2023-12-01 DIAGNOSIS — D50.9 IRON DEFICIENCY ANEMIA, UNSPECIFIED IRON DEFICIENCY ANEMIA TYPE: Primary | ICD-10-CM

## 2023-12-01 PROCEDURE — 88305 TISSUE EXAM BY PATHOLOGIST: CPT

## 2023-12-01 PROCEDURE — 45385 COLONOSCOPY W/LESION REMOVAL: CPT | Performed by: INTERNAL MEDICINE

## 2023-12-04 ENCOUNTER — LAB REQUISITION (OUTPATIENT)
Dept: LAB | Facility: HOSPITAL | Age: 68
End: 2023-12-04
Payer: MEDICARE

## 2023-12-04 DIAGNOSIS — K57.30 DIVERTICULOSIS OF LARGE INTESTINE WITHOUT PERFORATION OR ABSCESS WITHOUT BLEEDING: ICD-10-CM

## 2023-12-04 DIAGNOSIS — D50.9 IRON DEFICIENCY ANEMIA, UNSPECIFIED: ICD-10-CM

## 2023-12-04 DIAGNOSIS — D12.2 BENIGN NEOPLASM OF ASCENDING COLON: ICD-10-CM

## 2023-12-04 DIAGNOSIS — Z86.010 PERSONAL HISTORY OF COLONIC POLYPS: ICD-10-CM

## 2023-12-04 PROBLEM — Z99.81 O2 DEPENDENT: Status: ACTIVE | Noted: 2023-12-01

## 2023-12-05 LAB — REF LAB TEST METHOD: NORMAL

## 2023-12-12 ENCOUNTER — OFFICE VISIT (OUTPATIENT)
Dept: FAMILY MEDICINE CLINIC | Facility: CLINIC | Age: 68
End: 2023-12-12
Payer: MEDICARE

## 2023-12-12 VITALS
OXYGEN SATURATION: 95 % | WEIGHT: 212 LBS | HEIGHT: 62 IN | SYSTOLIC BLOOD PRESSURE: 160 MMHG | BODY MASS INDEX: 39.01 KG/M2 | HEART RATE: 88 BPM | DIASTOLIC BLOOD PRESSURE: 80 MMHG

## 2023-12-12 DIAGNOSIS — Z79.899 ENCOUNTER FOR LONG-TERM (CURRENT) USE OF OTHER MEDICATIONS: Primary | ICD-10-CM

## 2023-12-12 DIAGNOSIS — I50.31 ACUTE DIASTOLIC CONGESTIVE HEART FAILURE: ICD-10-CM

## 2023-12-12 DIAGNOSIS — M25.551 RIGHT HIP PAIN: ICD-10-CM

## 2023-12-12 DIAGNOSIS — F41.9 ANXIETY: ICD-10-CM

## 2023-12-12 LAB
POC AMPHETAMINES: NEGATIVE
POC BARBITURATES: NEGATIVE
POC BENZODIAZEPHINES: POSITIVE
POC COCAINE: NEGATIVE
POC METHADONE: NEGATIVE
POC METHAMPHETAMINE SCREEN URINE: NEGATIVE
POC OPIATES: POSITIVE
POC OXYCODONE: POSITIVE
POC PHENCYCLIDINE: NEGATIVE
POC PROPOXYPHENE: NEGATIVE
POC THC: NEGATIVE
POC TRICYCLIC ANTIDEPRESSANTS: NEGATIVE

## 2023-12-12 RX ORDER — ROSUVASTATIN CALCIUM 20 MG/1
20 TABLET, COATED ORAL DAILY
Qty: 90 TABLET | Refills: 1 | Status: SHIPPED | OUTPATIENT
Start: 2023-12-12

## 2023-12-12 RX ORDER — ALPRAZOLAM 1 MG/1
1 TABLET ORAL 2 TIMES DAILY PRN
Qty: 60 TABLET | Refills: 2 | Status: SHIPPED | OUTPATIENT
Start: 2023-12-12

## 2023-12-12 RX ORDER — DOXYCYCLINE HYCLATE 100 MG/1
100 CAPSULE ORAL 2 TIMES DAILY
Qty: 20 CAPSULE | Refills: 0 | Status: SHIPPED | OUTPATIENT
Start: 2023-12-12

## 2023-12-12 RX ORDER — ONDANSETRON HYDROCHLORIDE 8 MG/1
8 TABLET, FILM COATED ORAL EVERY 8 HOURS PRN
Qty: 15 TABLET | Refills: 0 | Status: SHIPPED | OUTPATIENT
Start: 2023-12-12

## 2023-12-12 RX ORDER — POTASSIUM CHLORIDE 750 MG/1
10 TABLET, FILM COATED, EXTENDED RELEASE ORAL DAILY
Qty: 90 TABLET | Refills: 1 | Status: SHIPPED | OUTPATIENT
Start: 2023-12-12

## 2023-12-12 RX ORDER — TRAMADOL HYDROCHLORIDE 50 MG/1
50 TABLET ORAL EVERY 8 HOURS PRN
Qty: 90 TABLET | Refills: 2 | Status: SHIPPED | OUTPATIENT
Start: 2023-12-12

## 2023-12-12 RX ORDER — FUROSEMIDE 40 MG/1
40 TABLET ORAL DAILY
Qty: 90 TABLET | Refills: 1 | Status: SHIPPED | OUTPATIENT
Start: 2023-12-12

## 2023-12-13 NOTE — PROGRESS NOTES
Follow Up Office Visit      Date of Visit:  2023   Patient Name: Radha Aldana  : 1955   MRN: [id number]     Chief Complaint:    Chief Complaint   Patient presents with    Anxiety    Cough       History of Present Illness: Radha Aldana is a 68 y.o. female who is here today for follow up.  Patient comes in today for recheck on anxiety.  Currently takes medication.  Situation stable.  Carlos report appropriate.  Patient also currently taking medications for her hip pain and history of heart failure.  This conditions are currently stable.        Subjective      Review of Systems:   Review of Systems   Constitutional:  Negative for fatigue and fever.   HENT:  Negative for congestion and ear pain.    Respiratory:  Negative for apnea, cough, chest tightness and shortness of breath.    Cardiovascular:  Negative for chest pain.   Gastrointestinal:  Negative for abdominal pain, constipation, diarrhea and nausea.   Musculoskeletal:  Negative for arthralgias.   Psychiatric/Behavioral:  Negative for depressed mood and stress.        Past Medical History:   Past Medical History:   Diagnosis Date    Anxiety     Chronic GERD     Emphysema     Essential hypertension     Mixed hyperlipidemia        Past Surgical History: No past surgical history on file.    Family History:   Family History   Problem Relation Age of Onset    Hypertension Father        Social History:   Social History     Socioeconomic History    Marital status:    Tobacco Use    Smoking status: Former     Packs/day: 1.00     Years: 55.00     Additional pack years: 0.00     Total pack years: 55.00     Types: Cigarettes     Start date:      Quit date:      Years since quitting: 10.9    Smokeless tobacco: Never   Vaping Use    Vaping Use: Never used   Substance and Sexual Activity    Alcohol use: Defer    Drug use: Never    Sexual activity: Defer       Medications:     Current Outpatient Medications:     ALPRAZolam (XANAX) 1 MG tablet,  Take 1 tablet by mouth 2 (Two) Times a Day As Needed for Anxiety. for anxiety, Disp: 60 tablet, Rfl: 2    furosemide (Lasix) 40 MG tablet, Take 1 tablet by mouth Daily., Disp: 90 tablet, Rfl: 1    ondansetron (Zofran) 8 MG tablet, Take 1 tablet by mouth Every 8 (Eight) Hours As Needed for Nausea or Vomiting., Disp: 15 tablet, Rfl: 0    potassium chloride 10 MEQ CR tablet, Take 1 tablet by mouth Daily., Disp: 90 tablet, Rfl: 1    rosuvastatin (CRESTOR) 20 MG tablet, Take 1 tablet by mouth Daily., Disp: 90 tablet, Rfl: 1    traMADol (ULTRAM) 50 MG tablet, Take 1 tablet by mouth Every 8 (Eight) Hours As Needed for Moderate Pain., Disp: 90 tablet, Rfl: 2    albuterol (ACCUNEB) 1.25 MG/3ML nebulizer solution, Take 3 mL by nebulization 3 (Three) Times a Day As Needed for Wheezing. Disp 4 boxes., Disp: 4 each, Rfl: 3    albuterol sulfate  (90 Base) MCG/ACT inhaler, Inhale 2 puffs Every 4 (Four) Hours As Needed for Wheezing., Disp: 18 g, Rfl: 5    doxycycline (VIBRAMYCIN) 100 MG capsule, Take 1 capsule by mouth 2 (Two) Times a Day., Disp: 20 capsule, Rfl: 0    fluconazole (Diflucan) 150 MG tablet, Take 1 tablet by mouth Daily., Disp: 1 tablet, Rfl: 1    fluorouracil (EFUDEX) 5 % cream, Apply 1 application  topically to the appropriate area as directed 2 (Two) Times a Day., Disp: , Rfl:     ketoconazole (NIZORAL) 2 % cream, Apply  topically to the appropriate area as directed 2 (Two) Times a Day., Disp: , Rfl:     levocetirizine (XYZAL) 5 MG tablet, Take 1 tablet by mouth Every Evening., Disp: , Rfl:     metoprolol succinate XL (TOPROL-XL) 50 MG 24 hr tablet, Take 1 tablet by mouth Daily., Disp: , Rfl:     mupirocin (BACTROBAN) 2 % ointment, Apply 1 application  topically to the appropriate area as directed 3 (Three) Times a Day., Disp: 22 g, Rfl: 1    nystatin (MYCOSTATIN) 392742 UNIT/GM ointment, APPLY A THIN LAYER TO CORNERS OF MOUTH TWICE DAILY, Disp: , Rfl:     pantoprazole (PROTONIX) 40 MG EC tablet, Take 1  "tablet by mouth Every 12 (Twelve) Hours., Disp: , Rfl:     Stiolto Respimat 2.5-2.5 MCG/ACT aerosol solution inhaler, Inhale 2 puffs Daily., Disp: 3 each, Rfl: 1    Allergies:   Allergies   Allergen Reactions    Atorvastatin Unknown (See Comments)    Sertraline Unknown - High Severity    Zocor [Simvastatin] Myalgia       Objective     Physical Exam:  Vital Signs:   Vitals:    12/12/23 1515   BP: 160/80   Pulse: 88   SpO2: 95%   Weight: 96.2 kg (212 lb)   Height: 157.5 cm (62\")     Body mass index is 38.78 kg/m².     Physical Exam  Vitals and nursing note reviewed.   Constitutional:       General: She is not in acute distress.     Appearance: Normal appearance. She is not ill-appearing.   HENT:      Head: Normocephalic and atraumatic.      Right Ear: Tympanic membrane and ear canal normal.      Left Ear: Tympanic membrane and ear canal normal.      Nose: Nose normal.   Cardiovascular:      Rate and Rhythm: Normal rate and regular rhythm.      Heart sounds: Normal heart sounds.   Pulmonary:      Effort: Pulmonary effort is normal.      Breath sounds: Normal breath sounds.   Neurological:      Mental Status: She is alert and oriented to person, place, and time. Mental status is at baseline.   Psychiatric:         Mood and Affect: Mood normal.         Procedures      Assessment / Plan      Assessment/Plan:   Diagnoses and all orders for this visit:    1. Encounter for long-term (current) use of other medications (Primary)  -     POC Urine Drug Screen, Triage    2. Right hip pain  -     traMADol (ULTRAM) 50 MG tablet; Take 1 tablet by mouth Every 8 (Eight) Hours As Needed for Moderate Pain.  Dispense: 90 tablet; Refill: 2  -     POC Urine Drug Screen, Triage    3. Acute diastolic congestive heart failure  -     furosemide (Lasix) 40 MG tablet; Take 1 tablet by mouth Daily.  Dispense: 90 tablet; Refill: 1    4. Anxiety  -     ALPRAZolam (XANAX) 1 MG tablet; Take 1 tablet by mouth 2 (Two) Times a Day As Needed for Anxiety. " for anxiety  Dispense: 60 tablet; Refill: 2  -     POC Urine Drug Screen, Triage    Other orders  -     doxycycline (VIBRAMYCIN) 100 MG capsule; Take 1 capsule by mouth 2 (Two) Times a Day.  Dispense: 20 capsule; Refill: 0  -     ondansetron (Zofran) 8 MG tablet; Take 1 tablet by mouth Every 8 (Eight) Hours As Needed for Nausea or Vomiting.  Dispense: 15 tablet; Refill: 0  -     rosuvastatin (CRESTOR) 20 MG tablet; Take 1 tablet by mouth Daily.  Dispense: 90 tablet; Refill: 1  -     potassium chloride 10 MEQ CR tablet; Take 1 tablet by mouth Daily.  Dispense: 90 tablet; Refill: 1         No changes in current medication.  Medication refills were given.  Carlos report appropriate.  Patient taking medications as directed.    Follow Up:   Return in about 3 months (around 3/12/2024) for Recheck.    Wilfrid Victoria  [facility] [place]

## 2024-03-12 ENCOUNTER — OFFICE VISIT (OUTPATIENT)
Dept: FAMILY MEDICINE CLINIC | Facility: CLINIC | Age: 69
End: 2024-03-12
Payer: MEDICARE

## 2024-03-12 VITALS
SYSTOLIC BLOOD PRESSURE: 130 MMHG | WEIGHT: 211 LBS | HEART RATE: 81 BPM | DIASTOLIC BLOOD PRESSURE: 60 MMHG | HEIGHT: 62 IN | OXYGEN SATURATION: 95 % | BODY MASS INDEX: 38.83 KG/M2

## 2024-03-12 DIAGNOSIS — F41.9 ANXIETY: Primary | ICD-10-CM

## 2024-03-12 DIAGNOSIS — H91.93 DECREASED HEARING OF BOTH EARS: ICD-10-CM

## 2024-03-12 DIAGNOSIS — J44.9 CHRONIC OBSTRUCTIVE PULMONARY DISEASE, UNSPECIFIED COPD TYPE: ICD-10-CM

## 2024-03-12 DIAGNOSIS — I10 PRIMARY HYPERTENSION: ICD-10-CM

## 2024-03-12 DIAGNOSIS — K21.9 GASTROESOPHAGEAL REFLUX DISEASE WITHOUT ESOPHAGITIS: ICD-10-CM

## 2024-03-12 DIAGNOSIS — M25.551 RIGHT HIP PAIN: ICD-10-CM

## 2024-03-12 PROCEDURE — 99214 OFFICE O/P EST MOD 30 MIN: CPT | Performed by: FAMILY MEDICINE

## 2024-03-12 RX ORDER — TIOTROPIUM BROMIDE AND OLODATEROL 3.124; 2.736 UG/1; UG/1
2 SPRAY, METERED RESPIRATORY (INHALATION) DAILY
Qty: 3 EACH | Refills: 1 | Status: SHIPPED | OUTPATIENT
Start: 2024-03-12

## 2024-03-12 RX ORDER — TRAMADOL HYDROCHLORIDE 50 MG/1
50 TABLET ORAL EVERY 8 HOURS PRN
Qty: 90 TABLET | Refills: 2 | Status: SHIPPED | OUTPATIENT
Start: 2024-03-12

## 2024-03-12 RX ORDER — PANTOPRAZOLE SODIUM 40 MG/1
40 TABLET, DELAYED RELEASE ORAL EVERY 12 HOURS SCHEDULED
Qty: 90 TABLET | Refills: 1 | Status: SHIPPED | OUTPATIENT
Start: 2024-03-12

## 2024-03-12 RX ORDER — ALPRAZOLAM 1 MG/1
1 TABLET ORAL 2 TIMES DAILY PRN
Qty: 60 TABLET | Refills: 2 | Status: SHIPPED | OUTPATIENT
Start: 2024-03-12

## 2024-03-12 NOTE — PROGRESS NOTES
Follow Up Office Visit      Date of Visit:  2024   Patient Name: Radha Aldana  : 1955   MRN: [id number]     Chief Complaint:    Chief Complaint   Patient presents with    Anxiety     Due for meds       History of Present Illness: Radha Aldana is a 69 y.o. female who is here today for follow up.  Patient seen today in need of recheck on medications she takes for anxiety and hip pain.  Situation stable.  Carlos report appropriate.  Patient take medications as directed.  We also [facility] filled medications for hypertension GERD and COPD.  She actually has complaints today of some decreased hearing in both ears.  She would like referral to ENT for further evaluation.        Subjective      Review of Systems:   Review of Systems   Constitutional:  Negative for fatigue and fever.   HENT:  Negative for congestion and ear pain.    Respiratory:  Negative for apnea, cough, chest tightness and shortness of breath.    Cardiovascular:  Negative for chest pain.   Gastrointestinal:  Negative for abdominal pain, constipation, diarrhea and nausea.   Musculoskeletal:  Negative for arthralgias.   Psychiatric/Behavioral:  Negative for depressed mood and stress.        Past Medical History:   Past Medical History:   Diagnosis Date    Anxiety     Chronic GERD     Emphysema     Essential hypertension     Mixed hyperlipidemia        Past Surgical History: No past surgical history on file.    Family History:   Family History   Problem Relation Age of Onset    Hypertension Father        Social History:   Social History     Socioeconomic History    Marital status:    Tobacco Use    Smoking status: Former     Current packs/day: 0.00     Average packs/day: 1 pack/day for 55.0 years (55.0 ttl pk-yrs)     Types: Cigarettes     Start date:      Quit date: 2013     Years since quittin.2    Smokeless tobacco: Never   Vaping Use    Vaping status: Never Used   Substance and Sexual Activity    Alcohol use: Defer     Drug use: Never    Sexual activity: Defer       Medications:     Current Outpatient Medications:     ALPRAZolam (XANAX) 1 MG tablet, Take 1 tablet by mouth 2 (Two) Times a Day As Needed for Anxiety. for anxiety, Disp: 60 tablet, Rfl: 2    pantoprazole (PROTONIX) 40 MG EC tablet, Take 1 tablet by mouth Every 12 (Twelve) Hours., Disp: 90 tablet, Rfl: 1    Stiolto Respimat 2.5-2.5 MCG/ACT aerosol solution inhaler, Inhale 2 puffs Daily., Disp: 3 each, Rfl: 1    traMADol (ULTRAM) 50 MG tablet, Take 1 tablet by mouth Every 8 (Eight) Hours As Needed for Moderate Pain., Disp: 90 tablet, Rfl: 2    albuterol (ACCUNEB) 1.25 MG/3ML nebulizer solution, Take 3 mL by nebulization 3 (Three) Times a Day As Needed for Wheezing. Disp 4 boxes., Disp: 4 each, Rfl: 3    albuterol sulfate  (90 Base) MCG/ACT inhaler, Inhale 2 puffs Every 4 (Four) Hours As Needed for Wheezing., Disp: 18 g, Rfl: 5    doxycycline (VIBRAMYCIN) 100 MG capsule, Take 1 capsule by mouth 2 (Two) Times a Day., Disp: 20 capsule, Rfl: 0    fluconazole (Diflucan) 150 MG tablet, Take 1 tablet by mouth Daily., Disp: 1 tablet, Rfl: 1    fluorouracil (EFUDEX) 5 % cream, Apply 1 application  topically to the appropriate area as directed 2 (Two) Times a Day., Disp: , Rfl:     furosemide (Lasix) 40 MG tablet, Take 1 tablet by mouth Daily., Disp: 90 tablet, Rfl: 1    ketoconazole (NIZORAL) 2 % cream, Apply  topically to the appropriate area as directed 2 (Two) Times a Day., Disp: , Rfl:     levocetirizine (XYZAL) 5 MG tablet, Take 1 tablet by mouth Every Evening., Disp: , Rfl:     metoprolol succinate XL (TOPROL-XL) 50 MG 24 hr tablet, Take 1 tablet by mouth Daily., Disp: , Rfl:     mupirocin (BACTROBAN) 2 % ointment, Apply 1 application  topically to the appropriate area as directed 3 (Three) Times a Day., Disp: 22 g, Rfl: 1    nystatin (MYCOSTATIN) 699524 UNIT/GM ointment, APPLY A THIN LAYER TO CORNERS OF MOUTH TWICE DAILY, Disp: , Rfl:     ondansetron (Zofran) 8 MG  "tablet, Take 1 tablet by mouth Every 8 (Eight) Hours As Needed for Nausea or Vomiting., Disp: 15 tablet, Rfl: 0    potassium chloride 10 MEQ CR tablet, Take 1 tablet by mouth Daily., Disp: 90 tablet, Rfl: 1    rosuvastatin (CRESTOR) 20 MG tablet, Take 1 tablet by mouth Daily., Disp: 90 tablet, Rfl: 1    Allergies:   Allergies   Allergen Reactions    Atorvastatin Unknown (See Comments)    Sertraline Unknown - High Severity    Zocor [Simvastatin] Myalgia       Objective     Physical Exam:  Vital Signs:   Vitals:    03/12/24 1357   BP: 130/60   Pulse: 81   SpO2: 95%   Weight: 95.7 kg (211 lb)   Height: 157.5 cm (62\")     Body mass index is 38.59 kg/m².     Physical Exam  Vitals and nursing note reviewed.   Constitutional:       General: She is not in acute distress.     Appearance: Normal appearance. She is not ill-appearing.   HENT:      Head: Normocephalic and atraumatic.      Right Ear: Tympanic membrane and ear canal normal.      Left Ear: Tympanic membrane and ear canal normal.      Nose: Nose normal.   Cardiovascular:      Rate and Rhythm: Normal rate and regular rhythm.      Heart sounds: Normal heart sounds.   Pulmonary:      Effort: Pulmonary effort is normal.      Breath sounds: Normal breath sounds.   Neurological:      Mental Status: She is alert and oriented to person, place, and time. Mental status is at baseline.   Psychiatric:         Mood and Affect: Mood normal.         Procedures      Assessment / Plan      Assessment/Plan:   Diagnoses and all orders for this visit:    1. Anxiety (Primary)  -     ALPRAZolam (XANAX) 1 MG tablet; Take 1 tablet by mouth 2 (Two) Times a Day As Needed for Anxiety. for anxiety  Dispense: 60 tablet; Refill: 2    2. Decreased hearing of both ears  -     Ambulatory Referral to ENT (Otolaryngology)    3. Right hip pain  -     traMADol (ULTRAM) 50 MG tablet; Take 1 tablet by mouth Every 8 (Eight) Hours As Needed for Moderate Pain.  Dispense: 90 tablet; Refill: 2    4. Chronic " obstructive pulmonary disease, unspecified COPD type  -     Stiolto Respimat 2.5-2.5 MCG/ACT aerosol solution inhaler; Inhale 2 puffs Daily.  Dispense: 3 each; Refill: 1    5. Gastroesophageal reflux disease without esophagitis  -     pantoprazole (PROTONIX) 40 MG EC tablet; Take 1 tablet by mouth Every 12 (Twelve) Hours.  Dispense: 90 tablet; Refill: 1    6. Primary hypertension  -     CBC Auto Differential  -     Comprehensive Metabolic Panel  -     Lipid Panel  -     TSH         Refilled chronic medications that were needed today.  Made referral for patient.  Checking appropriate blood work for medical conditions.    Follow Up:   No follow-ups on file.    Wilfrid Victoria  [facility] [place]

## 2024-03-13 LAB
ALBUMIN SERPL-MCNC: 4.2 G/DL (ref 3.8–4.8)
ALBUMIN/GLOB SERPL: 1.8 {RATIO} (ref 1.2–2.2)
ALP SERPL-CCNC: 103 IU/L (ref 44–121)
ALT SERPL-CCNC: 17 IU/L (ref 0–32)
AST SERPL-CCNC: 26 IU/L (ref 0–40)
BASOPHILS # BLD AUTO: 0.1 X10E3/UL (ref 0–0.2)
BASOPHILS NFR BLD AUTO: 1 %
BILIRUB SERPL-MCNC: 0.6 MG/DL (ref 0–1.2)
BUN SERPL-MCNC: 14 MG/DL (ref 8–27)
BUN/CREAT SERPL: 11 (ref 12–28)
CALCIUM SERPL-MCNC: 9.3 MG/DL (ref 8.7–10.3)
CHLORIDE SERPL-SCNC: 98 MMOL/L (ref 96–106)
CHOLEST SERPL-MCNC: 125 MG/DL (ref 100–199)
CO2 SERPL-SCNC: 29 MMOL/L (ref 20–29)
CREAT SERPL-MCNC: 1.23 MG/DL (ref 0.57–1)
EGFRCR SERPLBLD CKD-EPI 2021: 47 ML/MIN/1.73
EOSINOPHIL # BLD AUTO: 0.2 X10E3/UL (ref 0–0.4)
EOSINOPHIL NFR BLD AUTO: 2 %
ERYTHROCYTE [DISTWIDTH] IN BLOOD BY AUTOMATED COUNT: 12.8 % (ref 11.7–15.4)
GLOBULIN SER CALC-MCNC: 2.3 G/DL (ref 1.5–4.5)
GLUCOSE SERPL-MCNC: 84 MG/DL (ref 70–99)
HCT VFR BLD AUTO: 38.7 % (ref 34–46.6)
HDLC SERPL-MCNC: 48 MG/DL
HGB BLD-MCNC: 12.8 G/DL (ref 11.1–15.9)
IMM GRANULOCYTES # BLD AUTO: 0 X10E3/UL (ref 0–0.1)
IMM GRANULOCYTES NFR BLD AUTO: 0 %
LDLC SERPL CALC-MCNC: 55 MG/DL (ref 0–99)
LYMPHOCYTES # BLD AUTO: 2.3 X10E3/UL (ref 0.7–3.1)
LYMPHOCYTES NFR BLD AUTO: 28 %
MCH RBC QN AUTO: 30.5 PG (ref 26.6–33)
MCHC RBC AUTO-ENTMCNC: 33.1 G/DL (ref 31.5–35.7)
MCV RBC AUTO: 92 FL (ref 79–97)
MONOCYTES # BLD AUTO: 0.8 X10E3/UL (ref 0.1–0.9)
MONOCYTES NFR BLD AUTO: 9 %
NEUTROPHILS # BLD AUTO: 4.9 X10E3/UL (ref 1.4–7)
NEUTROPHILS NFR BLD AUTO: 60 %
PLATELET # BLD AUTO: 216 X10E3/UL (ref 150–450)
POTASSIUM SERPL-SCNC: 3.4 MMOL/L (ref 3.5–5.2)
PROT SERPL-MCNC: 6.5 G/DL (ref 6–8.5)
RBC # BLD AUTO: 4.2 X10E6/UL (ref 3.77–5.28)
SODIUM SERPL-SCNC: 143 MMOL/L (ref 134–144)
TRIGL SERPL-MCNC: 123 MG/DL (ref 0–149)
TSH SERPL DL<=0.005 MIU/L-ACNC: 2.18 UIU/ML (ref 0.45–4.5)
VLDLC SERPL CALC-MCNC: 22 MG/DL (ref 5–40)
WBC # BLD AUTO: 8.2 X10E3/UL (ref 3.4–10.8)

## 2024-03-18 ENCOUNTER — TELEPHONE (OUTPATIENT)
Dept: FAMILY MEDICINE CLINIC | Facility: CLINIC | Age: 69
End: 2024-03-18

## 2024-03-18 NOTE — TELEPHONE ENCOUNTER
Caller: Radha Aldana    Relationship: Self    Best call back number: [phone]    Caller requesting test results: SELF    What test was performed: LABS    When was the test performed: LAST WEEK    Where was the test performed: IN THE OFFICE

## 2024-03-21 NOTE — TELEPHONE ENCOUNTER
HUB TO RELAY--Labs are basically stable.  She does need to make sure she is drinking more fluids.  I was able to leave a message for pt to call back

## 2024-03-21 NOTE — TELEPHONE ENCOUNTER
Name: AldanaRadha granger      Relationship: Self      Best Callback Number: [phone]       HUB PROVIDED THE RELAY MESSAGE FROM THE OFFICE      PATIENT: VOICED UNDERSTANDING AND HAS NO FURTHER QUESTIONS AT THIS TIME

## 2024-03-21 NOTE — TELEPHONE ENCOUNTER
HUB RELAY    Tried to call, it rang twice and then the line went quiet? Not able to leave . Labs are basically stable. She does need to make sure she is drinking more fluids.

## 2024-05-13 ENCOUNTER — TELEPHONE (OUTPATIENT)
Dept: FAMILY MEDICINE CLINIC | Facility: CLINIC | Age: 69
End: 2024-05-13
Payer: MEDICARE

## 2024-05-13 NOTE — TELEPHONE ENCOUNTER
Caller: Radha Aldana    Relationship: Self    Best call back number: [phone]    What medication are you requesting: ANTIBIOTIC AND STEROID     What are your current symptoms: NASAL CONGESTION DIFFICULTY BREATHING FROM CONGESTION COUGH CHILLS     How long have you been experiencing symptoms: A COUPLE DAYS AGO     Have you had these symptoms before:    [x] Yes  [] No    Have you been treated for these symptoms before:   [] Yes  [x] No    If a prescription is needed, what is your preferred pharmacy and phone number: [facility] [address] [place] [address] [phone] [facility] [phone]      Additional notes: THE PATIENT STATES THAT EVERONE IN HER HOUSE IS GETTING THE FLU SHE WOULD LIKE A MEDICATION CALLED IN BEFORE HER SYMPTOMS GET BAD

## 2024-05-15 ENCOUNTER — OFFICE VISIT (OUTPATIENT)
Dept: FAMILY MEDICINE CLINIC | Facility: CLINIC | Age: 69
End: 2024-05-15
Payer: MEDICARE

## 2024-05-15 VITALS
BODY MASS INDEX: 37.73 KG/M2 | DIASTOLIC BLOOD PRESSURE: 80 MMHG | HEIGHT: 62 IN | SYSTOLIC BLOOD PRESSURE: 134 MMHG | OXYGEN SATURATION: 95 % | WEIGHT: 205 LBS | HEART RATE: 79 BPM

## 2024-05-15 DIAGNOSIS — J40 BRONCHITIS: Primary | ICD-10-CM

## 2024-05-15 DIAGNOSIS — R05.9 COUGH IN ADULT: ICD-10-CM

## 2024-05-15 DIAGNOSIS — F51.01 PRIMARY INSOMNIA: ICD-10-CM

## 2024-05-15 LAB
EXPIRATION DATE: NORMAL
FLUAV AG UPPER RESP QL IA.RAPID: NOT DETECTED
FLUBV AG UPPER RESP QL IA.RAPID: NOT DETECTED
INTERNAL CONTROL: NORMAL
Lab: NORMAL
SARS-COV-2 AG UPPER RESP QL IA.RAPID: NOT DETECTED

## 2024-05-15 PROCEDURE — 87428 SARSCOV & INF VIR A&B AG IA: CPT | Performed by: FAMILY MEDICINE

## 2024-05-15 PROCEDURE — 99214 OFFICE O/P EST MOD 30 MIN: CPT | Performed by: FAMILY MEDICINE

## 2024-05-15 RX ORDER — HYDROXYZINE HYDROCHLORIDE 25 MG/1
25 TABLET, FILM COATED ORAL
Qty: 30 TABLET | Refills: 2 | Status: SHIPPED | OUTPATIENT
Start: 2024-05-15

## 2024-05-15 RX ORDER — DEXTROMETHORPHAN HYDROBROMIDE AND PROMETHAZINE HYDROCHLORIDE 15; 6.25 MG/5ML; MG/5ML
5 SYRUP ORAL 4 TIMES DAILY PRN
Qty: 120 ML | Refills: 0 | Status: SHIPPED | OUTPATIENT
Start: 2024-05-15

## 2024-05-15 RX ORDER — LEVOFLOXACIN 500 MG/1
500 TABLET, FILM COATED ORAL DAILY
Qty: 10 TABLET | Refills: 0 | Status: SHIPPED | OUTPATIENT
Start: 2024-05-15

## 2024-05-15 RX ORDER — PREDNISONE 20 MG/1
TABLET ORAL
Qty: 18 TABLET | Refills: 0 | Status: SHIPPED | OUTPATIENT
Start: 2024-05-15

## 2024-05-15 NOTE — TELEPHONE ENCOUNTER
Well, if they have the flu the antibiotic is pretty worthless.  It be nice to know what were dealing with to better be able to treat her.  See if she can come in and see someone in the office tomorrow so we can get a better idea of things.

## 2024-05-16 NOTE — PROGRESS NOTES
Follow Up Office Visit      Date of Visit:  05/15/2024   Patient Name: Radha Aldana  : 1955   MRN: [id number]     Chief Complaint:    Chief Complaint   Patient presents with    Shortness of Breath       History of Present Illness: Radha Aldana is a 69 y.o. female who is here today for follow up.  Patient seen today for 3 to 4-day history of worsening cough and congestion.  Family members also have been sick.  Has COPD.  Lots of wheezing noted.  Patient also having some issues with insomnia.  Her  passed away few months ago.        Subjective      Review of Systems:   Review of Systems   Constitutional:  Negative for fatigue and fever.   HENT:  Negative for congestion and ear pain.    Respiratory:  Positive for cough and wheezing. Negative for apnea, chest tightness and shortness of breath.    Cardiovascular:  Negative for chest pain.   Gastrointestinal:  Negative for abdominal pain, constipation, diarrhea and nausea.   Musculoskeletal:  Negative for arthralgias.   Psychiatric/Behavioral:  Negative for depressed mood and stress.        Past Medical History:   Past Medical History:   Diagnosis Date    Anxiety     Chronic GERD     Emphysema     Essential hypertension     Mixed hyperlipidemia        Past Surgical History: No past surgical history on file.    Family History:   Family History   Problem Relation Age of Onset    Hypertension Father        Social History:   Social History     Socioeconomic History    Marital status:    Tobacco Use    Smoking status: Former     Current packs/day: 0.00     Average packs/day: 1 pack/day for 55.0 years (55.0 ttl pk-yrs)     Types: Cigarettes     Start date:      Quit date: 2013     Years since quittin.3    Smokeless tobacco: Never   Vaping Use    Vaping status: Never Used   Substance and Sexual Activity    Alcohol use: Defer    Drug use: Never    Sexual activity: Defer       Medications:     Current Outpatient Medications:     albuterol  (ACCUNEB) 1.25 MG/3ML nebulizer solution, Take 3 mL by nebulization 3 (Three) Times a Day As Needed for Wheezing. Disp 4 boxes., Disp: 4 each, Rfl: 3    albuterol sulfate  (90 Base) MCG/ACT inhaler, Inhale 2 puffs Every 4 (Four) Hours As Needed for Wheezing., Disp: 18 g, Rfl: 5    ALPRAZolam (XANAX) 1 MG tablet, Take 1 tablet by mouth 2 (Two) Times a Day As Needed for Anxiety. for anxiety, Disp: 60 tablet, Rfl: 2    doxycycline (VIBRAMYCIN) 100 MG capsule, Take 1 capsule by mouth 2 (Two) Times a Day., Disp: 20 capsule, Rfl: 0    fluconazole (Diflucan) 150 MG tablet, Take 1 tablet by mouth Daily., Disp: 1 tablet, Rfl: 1    fluorouracil (EFUDEX) 5 % cream, Apply 1 application  topically to the appropriate area as directed 2 (Two) Times a Day., Disp: , Rfl:     furosemide (Lasix) 40 MG tablet, Take 1 tablet by mouth Daily., Disp: 90 tablet, Rfl: 1    hydrOXYzine (ATARAX) 25 MG tablet, Take 1 tablet by mouth every night at bedtime., Disp: 30 tablet, Rfl: 2    ketoconazole (NIZORAL) 2 % cream, Apply  topically to the appropriate area as directed 2 (Two) Times a Day., Disp: , Rfl:     levocetirizine (XYZAL) 5 MG tablet, Take 1 tablet by mouth Every Evening., Disp: , Rfl:     levoFLOXacin (Levaquin) 500 MG tablet, Take 1 tablet by mouth Daily., Disp: 10 tablet, Rfl: 0    metoprolol succinate XL (TOPROL-XL) 50 MG 24 hr tablet, Take 1 tablet by mouth Daily., Disp: , Rfl:     mupirocin (BACTROBAN) 2 % ointment, Apply 1 application  topically to the appropriate area as directed 3 (Three) Times a Day., Disp: 22 g, Rfl: 1    nystatin (MYCOSTATIN) 818936 UNIT/GM ointment, APPLY A THIN LAYER TO CORNERS OF MOUTH TWICE DAILY, Disp: , Rfl:     ondansetron (Zofran) 8 MG tablet, Take 1 tablet by mouth Every 8 (Eight) Hours As Needed for Nausea or Vomiting., Disp: 15 tablet, Rfl: 0    pantoprazole (PROTONIX) 40 MG EC tablet, Take 1 tablet by mouth Every 12 (Twelve) Hours., Disp: 90 tablet, Rfl: 1    potassium chloride 10 MEQ CR  "tablet, Take 1 tablet by mouth Daily., Disp: 90 tablet, Rfl: 1    predniSONE (DELTASONE) 20 MG tablet, 3 po qd x3 d then 2 po qd x3d then 1 po qd x3d, Disp: 18 tablet, Rfl: 0    promethazine-dextromethorphan (PROMETHAZINE-DM) 6.25-15 MG/5ML syrup, Take 5 mL by mouth 4 (Four) Times a Day As Needed for Cough., Disp: 120 mL, Rfl: 0    rosuvastatin (CRESTOR) 20 MG tablet, Take 1 tablet by mouth Daily., Disp: 90 tablet, Rfl: 1    Stiolto Respimat 2.5-2.5 MCG/ACT aerosol solution inhaler, Inhale 2 puffs Daily., Disp: 3 each, Rfl: 1    traMADol (ULTRAM) 50 MG tablet, Take 1 tablet by mouth Every 8 (Eight) Hours As Needed for Moderate Pain., Disp: 90 tablet, Rfl: 2    Allergies:   Allergies   Allergen Reactions    Atorvastatin Unknown (See Comments)    Sertraline Unknown - High Severity    Zocor [Simvastatin] Myalgia       Objective     Physical Exam:  Vital Signs:   Vitals:    05/15/24 1443   BP: 134/80   Pulse: 79   SpO2: 95%   Weight: 93 kg (205 lb)   Height: 157.5 cm (62\")     Body mass index is 37.49 kg/m².     Physical Exam  Vitals and nursing note reviewed.   Constitutional:       General: She is not in acute distress.     Appearance: Normal appearance. She is not ill-appearing.   HENT:      Head: Normocephalic and atraumatic.      Right Ear: Tympanic membrane and ear canal normal.      Left Ear: Tympanic membrane and ear canal normal.      Nose: Nose normal.   Cardiovascular:      Rate and Rhythm: Normal rate and regular rhythm.      Heart sounds: Normal heart sounds.   Pulmonary:      Effort: Pulmonary effort is normal.      Breath sounds: Decreased air movement present. Wheezing present.   Neurological:      General: No focal deficit present.      Mental Status: She is alert and oriented to person, place, and time. Mental status is at baseline.   Psychiatric:         Mood and Affect: Mood normal.         Procedures      Assessment / Plan      Assessment/Plan:   Diagnoses and all orders for this visit:    1. " Bronchitis (Primary)    2. Cough in adult  -     POCT SARS-CoV-2 Antigen DENISE + Flu    3. Primary insomnia    Other orders  -     levoFLOXacin (Levaquin) 500 MG tablet; Take 1 tablet by mouth Daily.  Dispense: 10 tablet; Refill: 0  -     predniSONE (DELTASONE) 20 MG tablet; 3 po qd x3 d then 2 po qd x3d then 1 po qd x3d  Dispense: 18 tablet; Refill: 0  -     promethazine-dextromethorphan (PROMETHAZINE-DM) 6.25-15 MG/5ML syrup; Take 5 mL by mouth 4 (Four) Times a Day As Needed for Cough.  Dispense: 120 mL; Refill: 0  -     hydrOXYzine (ATARAX) 25 MG tablet; Take 1 tablet by mouth every night at bedtime.  Dispense: 30 tablet; Refill: 2         Gave antibiotics steroids and cough medication.  Medication given for insomnia as well.    Follow Up:   No follow-ups on file.    Wilfrid Victoria  [facility] [place]

## 2024-06-10 ENCOUNTER — OFFICE VISIT (OUTPATIENT)
Dept: FAMILY MEDICINE CLINIC | Facility: CLINIC | Age: 69
End: 2024-06-10
Payer: MEDICARE

## 2024-06-10 VITALS
SYSTOLIC BLOOD PRESSURE: 140 MMHG | WEIGHT: 202 LBS | DIASTOLIC BLOOD PRESSURE: 80 MMHG | HEIGHT: 62 IN | HEART RATE: 84 BPM | BODY MASS INDEX: 37.17 KG/M2 | OXYGEN SATURATION: 97 %

## 2024-06-10 DIAGNOSIS — F41.9 ANXIETY: ICD-10-CM

## 2024-06-10 DIAGNOSIS — I50.31 ACUTE DIASTOLIC CONGESTIVE HEART FAILURE: ICD-10-CM

## 2024-06-10 DIAGNOSIS — J44.9 CHRONIC OBSTRUCTIVE PULMONARY DISEASE, UNSPECIFIED COPD TYPE: ICD-10-CM

## 2024-06-10 DIAGNOSIS — E78.5 HYPERLIPIDEMIA, UNSPECIFIED HYPERLIPIDEMIA TYPE: ICD-10-CM

## 2024-06-10 DIAGNOSIS — M25.551 RIGHT HIP PAIN: ICD-10-CM

## 2024-06-10 DIAGNOSIS — B37.9 CANDIDIASIS: Primary | ICD-10-CM

## 2024-06-10 PROCEDURE — 99214 OFFICE O/P EST MOD 30 MIN: CPT | Performed by: FAMILY MEDICINE

## 2024-06-10 RX ORDER — FUROSEMIDE 40 MG/1
40 TABLET ORAL DAILY
Qty: 90 TABLET | Refills: 1 | Status: SHIPPED | OUTPATIENT
Start: 2024-06-10

## 2024-06-10 RX ORDER — POTASSIUM CHLORIDE 750 MG/1
10 TABLET, FILM COATED, EXTENDED RELEASE ORAL DAILY
Qty: 90 TABLET | Refills: 1 | Status: SHIPPED | OUTPATIENT
Start: 2024-06-10

## 2024-06-10 RX ORDER — FLUCONAZOLE 150 MG/1
150 TABLET ORAL EVERY OTHER DAY
Qty: 2 TABLET | Refills: 0 | Status: SHIPPED | OUTPATIENT
Start: 2024-06-10

## 2024-06-10 RX ORDER — ROSUVASTATIN CALCIUM 20 MG/1
20 TABLET, COATED ORAL DAILY
Qty: 90 TABLET | Refills: 1 | Status: SHIPPED | OUTPATIENT
Start: 2024-06-10

## 2024-06-10 RX ORDER — ALBUTEROL SULFATE 90 UG/1
2 AEROSOL, METERED RESPIRATORY (INHALATION) EVERY 4 HOURS PRN
Qty: 18 G | Refills: 5 | Status: SHIPPED | OUTPATIENT
Start: 2024-06-10

## 2024-06-10 RX ORDER — TRAMADOL HYDROCHLORIDE 50 MG/1
50 TABLET ORAL EVERY 8 HOURS PRN
Qty: 90 TABLET | Refills: 2 | Status: SHIPPED | OUTPATIENT
Start: 2024-06-10

## 2024-06-10 RX ORDER — ALPRAZOLAM 1 MG/1
1 TABLET ORAL 2 TIMES DAILY PRN
Qty: 60 TABLET | Refills: 2 | Status: SHIPPED | OUTPATIENT
Start: 2024-06-10

## 2024-06-10 NOTE — PROGRESS NOTES
Follow Up Office Visit      Date of Visit:  06/10/2024   Patient Name: Radha Aldana  : 1955   MRN: [id number]     Chief Complaint:  No chief complaint on file.      History of Present Illness: Radha Aldana is a 69 y.o. female who is here today for follow up.  Patient seen for recheck on COPD hip pain anxiety congestive heart failure and hyperlipidemia.  Conditions have overall remained stable.  She overall she is doing well today.  Improved some on her anxiety.   passed away few months ago.  Patient currently with candidiasis following some bronchitis she had any antibiotics recently.        Subjective      Review of Systems:   Review of Systems   Constitutional:  Negative for fatigue and fever.   HENT:  Negative for congestion and ear pain.    Respiratory:  Negative for apnea, cough, chest tightness and shortness of breath.    Cardiovascular:  Negative for chest pain.   Gastrointestinal:  Negative for abdominal pain, constipation, diarrhea and nausea.   Musculoskeletal:  Negative for arthralgias.   Psychiatric/Behavioral:  Negative for depressed mood and stress.        Past Medical History:   Past Medical History:   Diagnosis Date    Anxiety     Chronic GERD     Emphysema     Essential hypertension     Mixed hyperlipidemia        Past Surgical History: No past surgical history on file.    Family History:   Family History   Problem Relation Age of Onset    Hypertension Father        Social History:   Social History     Socioeconomic History    Marital status:    Tobacco Use    Smoking status: Former     Current packs/day: 0.00     Average packs/day: 1 pack/day for 55.0 years (55.0 ttl pk-yrs)     Types: Cigarettes     Start date:      Quit date: 2013     Years since quittin.4    Smokeless tobacco: Never   Vaping Use    Vaping status: Never Used   Substance and Sexual Activity    Alcohol use: Defer    Drug use: Never    Sexual activity: Defer       Medications:     Current  Outpatient Medications:     albuterol sulfate  (90 Base) MCG/ACT inhaler, Inhale 2 puffs Every 4 (Four) Hours As Needed for Wheezing., Disp: 18 g, Rfl: 5    ALPRAZolam (XANAX) 1 MG tablet, Take 1 tablet by mouth 2 (Two) Times a Day As Needed for Anxiety. for anxiety, Disp: 60 tablet, Rfl: 2    furosemide (Lasix) 40 MG tablet, Take 1 tablet by mouth Daily., Disp: 90 tablet, Rfl: 1    potassium chloride 10 MEQ CR tablet, Take 1 tablet by mouth Daily., Disp: 90 tablet, Rfl: 1    rosuvastatin (CRESTOR) 20 MG tablet, Take 1 tablet by mouth Daily., Disp: 90 tablet, Rfl: 1    traMADol (ULTRAM) 50 MG tablet, Take 1 tablet by mouth Every 8 (Eight) Hours As Needed for Moderate Pain., Disp: 90 tablet, Rfl: 2    albuterol (ACCUNEB) 1.25 MG/3ML nebulizer solution, Take 3 mL by nebulization 3 (Three) Times a Day As Needed for Wheezing. Disp 4 boxes., Disp: 4 each, Rfl: 3    fluconazole (Diflucan) 150 MG tablet, Take 1 tablet by mouth Every Other Day., Disp: 2 tablet, Rfl: 0    fluorouracil (EFUDEX) 5 % cream, Apply 1 application  topically to the appropriate area as directed 2 (Two) Times a Day., Disp: , Rfl:     hydrOXYzine (ATARAX) 25 MG tablet, Take 1 tablet by mouth every night at bedtime., Disp: 30 tablet, Rfl: 2    ketoconazole (NIZORAL) 2 % cream, Apply  topically to the appropriate area as directed 2 (Two) Times a Day., Disp: , Rfl:     levocetirizine (XYZAL) 5 MG tablet, Take 1 tablet by mouth Every Evening., Disp: , Rfl:     metoprolol succinate XL (TOPROL-XL) 50 MG 24 hr tablet, Take 1 tablet by mouth Daily., Disp: , Rfl:     mupirocin (BACTROBAN) 2 % ointment, Apply 1 application  topically to the appropriate area as directed 3 (Three) Times a Day., Disp: 22 g, Rfl: 1    nystatin (MYCOSTATIN) 878352 UNIT/GM ointment, APPLY A THIN LAYER TO CORNERS OF MOUTH TWICE DAILY, Disp: , Rfl:     pantoprazole (PROTONIX) 40 MG EC tablet, Take 1 tablet by mouth Every 12 (Twelve) Hours., Disp: 90 tablet, Rfl: 1    Stiolto  "Respimat 2.5-2.5 MCG/ACT aerosol solution inhaler, Inhale 2 puffs Daily., Disp: 3 each, Rfl: 1    Allergies:   Allergies   Allergen Reactions    Atorvastatin Unknown (See Comments)    Sertraline Unknown - High Severity    Zocor [Simvastatin] Myalgia       Objective     Physical Exam:  Vital Signs:   Vitals:    06/10/24 1329   BP: 140/80   Pulse: 84   SpO2: 97%   Weight: 91.6 kg (202 lb)   Height: 157.5 cm (62\")     Body mass index is 36.95 kg/m².     Physical Exam  Vitals and nursing note reviewed.   Constitutional:       General: She is not in acute distress.     Appearance: Normal appearance. She is not ill-appearing.   HENT:      Head: Normocephalic and atraumatic.      Right Ear: Tympanic membrane and ear canal normal.      Left Ear: Tympanic membrane and ear canal normal.      Nose: Nose normal.   Cardiovascular:      Rate and Rhythm: Normal rate and regular rhythm.      Heart sounds: Normal heart sounds.   Pulmonary:      Effort: Pulmonary effort is normal.      Breath sounds: Normal breath sounds.   Neurological:      Mental Status: She is alert and oriented to person, place, and time. Mental status is at baseline.   Psychiatric:         Mood and Affect: Mood normal.         Procedures      Assessment / Plan      Assessment/Plan:   Diagnoses and all orders for this visit:    1. Candidiasis (Primary)  -     fluconazole (Diflucan) 150 MG tablet; Take 1 tablet by mouth Every Other Day.  Dispense: 2 tablet; Refill: 0    2. Chronic obstructive pulmonary disease, unspecified COPD type  -     albuterol sulfate  (90 Base) MCG/ACT inhaler; Inhale 2 puffs Every 4 (Four) Hours As Needed for Wheezing.  Dispense: 18 g; Refill: 5    3. Right hip pain  -     traMADol (ULTRAM) 50 MG tablet; Take 1 tablet by mouth Every 8 (Eight) Hours As Needed for Moderate Pain.  Dispense: 90 tablet; Refill: 2    4. Anxiety  -     ALPRAZolam (XANAX) 1 MG tablet; Take 1 tablet by mouth 2 (Two) Times a Day As Needed for Anxiety. for " anxiety  Dispense: 60 tablet; Refill: 2    5. Acute diastolic congestive heart failure  -     potassium chloride 10 MEQ CR tablet; Take 1 tablet by mouth Daily.  Dispense: 90 tablet; Refill: 1  -     furosemide (Lasix) 40 MG tablet; Take 1 tablet by mouth Daily.  Dispense: 90 tablet; Refill: 1    6. Hyperlipidemia, unspecified hyperlipidemia type  -     furosemide (Lasix) 40 MG tablet; Take 1 tablet by mouth Daily.  Dispense: 90 tablet; Refill: 1    Other orders  -     rosuvastatin (CRESTOR) 20 MG tablet; Take 1 tablet by mouth Daily.  Dispense: 90 tablet; Refill: 1         Reviewed previous blood work.  Continue all current medications.  Medication refills given.  No changes other than giving nystatin for the yeast.    Follow Up:   No follow-ups on file.    Wilfrid Victoria  [facility] [place]

## 2024-07-09 ENCOUNTER — TELEPHONE (OUTPATIENT)
Dept: FAMILY MEDICINE CLINIC | Facility: CLINIC | Age: 69
End: 2024-07-09
Payer: MEDICARE

## 2024-07-09 DIAGNOSIS — J44.9 CHRONIC OBSTRUCTIVE PULMONARY DISEASE, UNSPECIFIED COPD TYPE: Primary | ICD-10-CM

## 2024-07-09 NOTE — TELEPHONE ENCOUNTER
Caller: Radha Aldana    Relationship: Self    Best call back number: [phone]     What orders are you requesting (i.e. lab or imaging): PORTABLE OXYGEN TANK    In what timeframe would the patient need to come in: TODAY    Where will you receive your lab/imaging services: HOME    Additional notes: PHONE NUMBER [phone]

## 2024-07-15 ENCOUNTER — TELEPHONE (OUTPATIENT)
Dept: FAMILY MEDICINE CLINIC | Facility: CLINIC | Age: 69
End: 2024-07-15
Payer: MEDICARE

## 2024-07-15 NOTE — TELEPHONE ENCOUNTER
Caller: [facility]    Relationship:     Best call back number: [phone] ASK FOR JOAN    What is the best time to reach you: ANY    Who are you requesting to speak with (clinical staff, provider,  specific staff member): NURSE    Do you know the name of the person who called: JOAN    What was the call regarding: A LAB REQUISITION FORM WAS FAXED LAST WEEK FOR THE DOCTORS SIGNATURE.  HAVE WE RECEIVED THIS?    Is it okay if the provider responds through MyChart: PHONE CALL PLEASE

## 2024-07-17 ENCOUNTER — TELEPHONE (OUTPATIENT)
Dept: FAMILY MEDICINE CLINIC | Facility: CLINIC | Age: 69
End: 2024-07-17
Payer: MEDICARE

## 2024-07-17 NOTE — TELEPHONE ENCOUNTER
Caller: AldanaRadha granger    Relationship: Self    Best call back number: [phone]    What form or medical record are you requesting: PAPERWORK AND ORDER FOR PORTABLE OXYGEN CONCENTRATOR   Who is requesting this form or medical record from you: THE PATIENT RADHA     How would you like to receive the form or medical records (pick-up, mail, fax):   If fax, what is the fax number: [phone]    Timeframe paperwork needed: ASAP SO SHE CAN GET HER PORTABLE OXYGEN CONCENTRATOR [facility].     Additional notes: PLEASE CALL THE PATIENT IF ANY QUESTION OR CONCERNS OR CALL [facility] [phone].

## 2024-07-18 NOTE — TELEPHONE ENCOUNTER
We have the paperwork and I have spoke to the company dealing with this, Julien is out of the office and will be back next week.

## 2024-07-25 ENCOUNTER — TELEPHONE (OUTPATIENT)
Dept: FAMILY MEDICINE CLINIC | Facility: CLINIC | Age: 69
End: 2024-07-25
Payer: MEDICARE

## 2024-07-25 NOTE — TELEPHONE ENCOUNTER
Yassine from [facility] calling back for Ros, would like call back  [phone]

## 2024-07-26 NOTE — TELEPHONE ENCOUNTER
HUB TO RELAY   Calling pt to see what company she would like to use for  portable oxgen supplies ???

## 2024-08-01 NOTE — TELEPHONE ENCOUNTER
HUB TO READ Left another message for pt, we have faxed portable o2 orders to [employer] but another company keeps calling wanting orders, we need to know which company patient would like to use.

## 2024-08-02 ENCOUNTER — TELEPHONE (OUTPATIENT)
Dept: FAMILY MEDICINE CLINIC | Facility: CLINIC | Age: 69
End: 2024-08-02
Payer: MEDICARE

## 2024-08-02 NOTE — TELEPHONE ENCOUNTER
Yassine from [facility] calling back for Ros, would like call back ABOUT A LAB ORDER THAT WAS FAXED JULY 12TH AND 24TH THEY ARE UNSURE AS TO HOW TO GET THIS RESOLVED    [phone]

## 2024-09-10 ENCOUNTER — OFFICE VISIT (OUTPATIENT)
Dept: FAMILY MEDICINE CLINIC | Facility: CLINIC | Age: 69
End: 2024-09-10
Payer: MEDICARE

## 2024-09-10 VITALS
BODY MASS INDEX: 36.8 KG/M2 | OXYGEN SATURATION: 95 % | DIASTOLIC BLOOD PRESSURE: 70 MMHG | HEIGHT: 62 IN | SYSTOLIC BLOOD PRESSURE: 114 MMHG | WEIGHT: 200 LBS | HEART RATE: 86 BPM

## 2024-09-10 DIAGNOSIS — F41.9 ANXIETY: ICD-10-CM

## 2024-09-10 DIAGNOSIS — I10 PRIMARY HYPERTENSION: ICD-10-CM

## 2024-09-10 DIAGNOSIS — R73.9 HYPERGLYCEMIA: ICD-10-CM

## 2024-09-10 DIAGNOSIS — J44.9 CHRONIC OBSTRUCTIVE PULMONARY DISEASE, UNSPECIFIED COPD TYPE: Primary | ICD-10-CM

## 2024-09-10 DIAGNOSIS — F51.01 PRIMARY INSOMNIA: ICD-10-CM

## 2024-09-10 DIAGNOSIS — M25.551 RIGHT HIP PAIN: ICD-10-CM

## 2024-09-10 PROCEDURE — 99214 OFFICE O/P EST MOD 30 MIN: CPT | Performed by: FAMILY MEDICINE

## 2024-09-10 RX ORDER — FAMOTIDINE 20 MG/1
20 TABLET, FILM COATED ORAL 2 TIMES DAILY
Qty: 60 TABLET | Refills: 5 | Status: SHIPPED | OUTPATIENT
Start: 2024-09-10

## 2024-09-10 RX ORDER — ALPRAZOLAM 1 MG
1 TABLET ORAL 2 TIMES DAILY PRN
Qty: 60 TABLET | Refills: 2 | Status: SHIPPED | OUTPATIENT
Start: 2024-09-10

## 2024-09-10 RX ORDER — AZITHROMYCIN 250 MG/1
TABLET, FILM COATED ORAL
Qty: 6 TABLET | Refills: 0 | Status: SHIPPED | OUTPATIENT
Start: 2024-09-10

## 2024-09-10 RX ORDER — PREDNISONE 20 MG/1
40 TABLET ORAL DAILY
Qty: 10 TABLET | Refills: 0 | Status: SHIPPED | OUTPATIENT
Start: 2024-09-10 | End: 2024-09-15

## 2024-09-10 RX ORDER — TRAMADOL HYDROCHLORIDE 50 MG/1
50 TABLET ORAL EVERY 8 HOURS PRN
Qty: 90 TABLET | Refills: 2 | Status: SHIPPED | OUTPATIENT
Start: 2024-09-10

## 2024-09-10 RX ORDER — POTASSIUM CHLORIDE 750 MG/1
TABLET, FILM COATED, EXTENDED RELEASE ORAL
COMMUNITY

## 2024-09-10 RX ORDER — TIOTROPIUM BROMIDE AND OLODATEROL 3.124; 2.736 UG/1; UG/1
2 SPRAY, METERED RESPIRATORY (INHALATION) DAILY
Qty: 3 EACH | Refills: 1 | Status: SHIPPED | OUTPATIENT
Start: 2024-09-10

## 2024-09-11 LAB
ALBUMIN SERPL-MCNC: 3.9 G/DL (ref 3.8–4.8)
ALP SERPL-CCNC: 99 IU/L (ref 44–121)
ALT SERPL-CCNC: 14 IU/L (ref 0–32)
AST SERPL-CCNC: 27 IU/L (ref 0–40)
BASOPHILS # BLD AUTO: 0.1 X10E3/UL (ref 0–0.2)
BASOPHILS NFR BLD AUTO: 1 %
BILIRUB SERPL-MCNC: 0.4 MG/DL (ref 0–1.2)
BUN SERPL-MCNC: 17 MG/DL (ref 8–27)
BUN/CREAT SERPL: 10 (ref 12–28)
CALCIUM SERPL-MCNC: 9.7 MG/DL (ref 8.7–10.3)
CHLORIDE SERPL-SCNC: 93 MMOL/L (ref 96–106)
CHOLEST SERPL-MCNC: 154 MG/DL (ref 100–199)
CO2 SERPL-SCNC: 35 MMOL/L (ref 20–29)
CREAT SERPL-MCNC: 1.62 MG/DL (ref 0.57–1)
EGFRCR SERPLBLD CKD-EPI 2021: 34 ML/MIN/1.73
EOSINOPHIL # BLD AUTO: 0.2 X10E3/UL (ref 0–0.4)
EOSINOPHIL NFR BLD AUTO: 2 %
ERYTHROCYTE [DISTWIDTH] IN BLOOD BY AUTOMATED COUNT: 12.3 % (ref 11.7–15.4)
GLOBULIN SER CALC-MCNC: 2.5 G/DL (ref 1.5–4.5)
GLUCOSE SERPL-MCNC: 112 MG/DL (ref 70–99)
HBA1C MFR BLD: 5.8 % (ref 4.8–5.6)
HCT VFR BLD AUTO: 38.6 % (ref 34–46.6)
HDLC SERPL-MCNC: 63 MG/DL
HGB BLD-MCNC: 12.7 G/DL (ref 11.1–15.9)
IMM GRANULOCYTES # BLD AUTO: 0 X10E3/UL (ref 0–0.1)
IMM GRANULOCYTES NFR BLD AUTO: 0 %
LDLC SERPL CALC-MCNC: 71 MG/DL (ref 0–99)
LYMPHOCYTES # BLD AUTO: 2.4 X10E3/UL (ref 0.7–3.1)
LYMPHOCYTES NFR BLD AUTO: 22 %
MCH RBC QN AUTO: 30.4 PG (ref 26.6–33)
MCHC RBC AUTO-ENTMCNC: 32.9 G/DL (ref 31.5–35.7)
MCV RBC AUTO: 92 FL (ref 79–97)
MONOCYTES # BLD AUTO: 0.8 X10E3/UL (ref 0.1–0.9)
MONOCYTES NFR BLD AUTO: 8 %
NEUTROPHILS # BLD AUTO: 7.5 X10E3/UL (ref 1.4–7)
NEUTROPHILS NFR BLD AUTO: 67 %
PLATELET # BLD AUTO: 232 X10E3/UL (ref 150–450)
POTASSIUM SERPL-SCNC: 3.1 MMOL/L (ref 3.5–5.2)
PROT SERPL-MCNC: 6.4 G/DL (ref 6–8.5)
RBC # BLD AUTO: 4.18 X10E6/UL (ref 3.77–5.28)
SODIUM SERPL-SCNC: 142 MMOL/L (ref 134–144)
TRIGL SERPL-MCNC: 109 MG/DL (ref 0–149)
TSH SERPL DL<=0.005 MIU/L-ACNC: 1.12 UIU/ML (ref 0.45–4.5)
VLDLC SERPL CALC-MCNC: 20 MG/DL (ref 5–40)
WBC # BLD AUTO: 11 X10E3/UL (ref 3.4–10.8)

## 2024-09-11 NOTE — PROGRESS NOTES
Follow Up Office Visit      Date of Visit:  09/10/2024   Patient Name: Radha Aldana  : 1955   MRN: [id number]     Chief Complaint:    Chief Complaint   Patient presents with    Med Refill       History of Present Illness: Radha Aldana is a 69 y.o. female who is here today for follow up.    History of Present Illness  The patient is a 69-year-old female here to follow up on her chronic medical conditions.    Acid reflux medicine not controlling current symptoms of acid reflux.  Had been on pantoprazole.  Stomach cramps were noted with medication.    She uses an albuterol inhaler as needed and has a nebulizer machine, which she hasn't used recently. She has been prescribed Zithromax and prednisone, which she only takes as needed for COPD exacerbation.  On home oxygen.    She also takes hydroxyzine at night to aid sleep. She reports feeling better overall. She requires a refill of Xanax.    She has requested a blood sugar test. She has eliminated sweets from her diet and consumes pineapples and apples.    She experienced a brief episode of dizziness once.    No recent COPD exacerbations.  Hypertension insomnia hip pain stable.      Subjective      Review of Systems:   Review of Systems   Constitutional:  Negative for fatigue and fever.   HENT:  Negative for congestion and ear pain.    Respiratory:  Negative for apnea, cough, chest tightness and shortness of breath.    Cardiovascular:  Negative for chest pain.   Gastrointestinal:  Negative for abdominal pain, constipation, diarrhea and nausea.   Musculoskeletal:  Negative for arthralgias.   Psychiatric/Behavioral:  Negative for depressed mood and stress.        Past Medical History:   Past Medical History:   Diagnosis Date    Anxiety     Chronic GERD     Emphysema     Essential hypertension     Mixed hyperlipidemia        Past Surgical History: No past surgical history on file.    Family History:   Family History   Problem Relation Age of Onset     Hypertension Father        Social History:   Social History     Socioeconomic History    Marital status:    Tobacco Use    Smoking status: Former     Current packs/day: 0.00     Average packs/day: 1 pack/day for 55.0 years (55.0 ttl pk-yrs)     Types: Cigarettes     Start date:      Quit date: 2013     Years since quittin.7    Smokeless tobacco: Never   Vaping Use    Vaping status: Never Used   Substance and Sexual Activity    Alcohol use: Defer    Drug use: Never    Sexual activity: Defer       Medications:     Current Outpatient Medications:     ALPRAZolam (XANAX) 1 MG tablet, Take 1 tablet by mouth 2 (Two) Times a Day As Needed for Anxiety. for anxiety, Disp: 60 tablet, Rfl: 2    Stiolto Respimat 2.5-2.5 MCG/ACT aerosol solution inhaler, Inhale 2 puffs Daily., Disp: 3 each, Rfl: 1    traMADol (ULTRAM) 50 MG tablet, Take 1 tablet by mouth Every 8 (Eight) Hours As Needed for Moderate Pain., Disp: 90 tablet, Rfl: 2    albuterol (ACCUNEB) 1.25 MG/3ML nebulizer solution, Take 3 mL by nebulization 3 (Three) Times a Day As Needed for Wheezing. Disp 4 boxes., Disp: 4 each, Rfl: 3    albuterol sulfate  (90 Base) MCG/ACT inhaler, Inhale 2 puffs Every 4 (Four) Hours As Needed for Wheezing., Disp: 18 g, Rfl: 5    azithromycin (Zithromax Z-Bernardino) 250 MG tablet, Take 2 tablets by mouth on day 1, then 1 tablet daily on days 2-5, Disp: 6 tablet, Rfl: 0    famotidine (Pepcid) 20 MG tablet, Take 1 tablet by mouth 2 (Two) Times a Day., Disp: 60 tablet, Rfl: 5    fluconazole (Diflucan) 150 MG tablet, Take 1 tablet by mouth Every Other Day., Disp: 2 tablet, Rfl: 0    fluorouracil (EFUDEX) 5 % cream, Apply 1 application  topically to the appropriate area as directed 2 (Two) Times a Day., Disp: , Rfl:     furosemide (Lasix) 40 MG tablet, Take 1 tablet by mouth Daily., Disp: 90 tablet, Rfl: 1    ketoconazole (NIZORAL) 2 % cream, Apply  topically to the appropriate area as directed 2 (Two) Times a Day., Disp: , Rfl:  "    levocetirizine (XYZAL) 5 MG tablet, Take 1 tablet by mouth Every Evening., Disp: , Rfl:     metoprolol succinate XL (TOPROL-XL) 50 MG 24 hr tablet, Take 1 tablet by mouth Daily., Disp: , Rfl:     mupirocin (BACTROBAN) 2 % ointment, Apply 1 application  topically to the appropriate area as directed 3 (Three) Times a Day., Disp: 22 g, Rfl: 1    nystatin (MYCOSTATIN) 044932 UNIT/GM ointment, APPLY A THIN LAYER TO CORNERS OF MOUTH TWICE DAILY, Disp: , Rfl:     potassium chloride (K-DUR,KLOR-CON) 10 MEQ ER tablet, Take  by mouth., Disp: , Rfl:     potassium chloride 10 MEQ CR tablet, Take 1 tablet by mouth Daily., Disp: 90 tablet, Rfl: 1    predniSONE (DELTASONE) 20 MG tablet, Take 2 tablets by mouth Daily for 5 days., Disp: 10 tablet, Rfl: 0    rosuvastatin (CRESTOR) 20 MG tablet, Take 1 tablet by mouth Daily., Disp: 90 tablet, Rfl: 1    Allergies:   Allergies   Allergen Reactions    Atorvastatin Unknown (See Comments)    Sertraline Unknown - High Severity    Zocor [Simvastatin] Myalgia       Objective     Physical Exam:  Vital Signs:   Vitals:    09/10/24 1328   BP: 114/70   Pulse: 86   SpO2: 95%   Weight: 90.7 kg (200 lb)   Height: 157.5 cm (62\")     Body mass index is 36.58 kg/m².     Physical Exam  Vitals and nursing note reviewed.   Constitutional:       General: She is not in acute distress.     Appearance: Normal appearance. She is not ill-appearing.   HENT:      Head: Normocephalic and atraumatic.      Right Ear: Tympanic membrane and ear canal normal.      Left Ear: Tympanic membrane and ear canal normal.      Nose: Nose normal.   Cardiovascular:      Rate and Rhythm: Normal rate and regular rhythm.      Heart sounds: Normal heart sounds.   Pulmonary:      Effort: Pulmonary effort is normal.      Breath sounds: Normal breath sounds.   Neurological:      Mental Status: She is alert and oriented to person, place, and time. Mental status is at baseline.   Psychiatric:         Mood and Affect: Mood normal. "       Physical Exam  Vital Signs  Vitals show a blood pressure of 114/70.    Procedures    Results  Laboratory Studies  Cholesterol is good. Potassium is good.  Assessment / Plan      Assessment/Plan:   Diagnoses and all orders for this visit:    1. Chronic obstructive pulmonary disease, unspecified COPD type (Primary)  -     Stiolto Respimat 2.5-2.5 MCG/ACT aerosol solution inhaler; Inhale 2 puffs Daily.  Dispense: 3 each; Refill: 1    2. Right hip pain  -     traMADol (ULTRAM) 50 MG tablet; Take 1 tablet by mouth Every 8 (Eight) Hours As Needed for Moderate Pain.  Dispense: 90 tablet; Refill: 2    3. Anxiety  -     ALPRAZolam (XANAX) 1 MG tablet; Take 1 tablet by mouth 2 (Two) Times a Day As Needed for Anxiety. for anxiety  Dispense: 60 tablet; Refill: 2    4. Primary hypertension  -     CBC & Differential  -     Comprehensive Metabolic Panel  -     Lipid Panel  -     TSH    5. Hyperglycemia  -     Hemoglobin A1c    6. Primary insomnia    Other orders  -     famotidine (Pepcid) 20 MG tablet; Take 1 tablet by mouth 2 (Two) Times a Day.  Dispense: 60 tablet; Refill: 5  -     azithromycin (Zithromax Z-Bernardino) 250 MG tablet; Take 2 tablets by mouth on day 1, then 1 tablet daily on days 2-5  Dispense: 6 tablet; Refill: 0  -     predniSONE (DELTASONE) 20 MG tablet; Take 2 tablets by mouth Daily for 5 days.  Dispense: 10 tablet; Refill: 0       Assessment & Plan  1. Weight Loss.  She has experienced an 11-pound weight loss since her last visit. This progress was acknowledged and she is encouraged to continue her current regimen, including her exercise routine.    2. Hypertension.  Her blood pressure is 114/70, which is within the normal range. She is advised to continue her current medication regimen.    3. Gastroesophageal Reflux Disease (GERD).  A prescription for famotidine 20 mg to be taken twice daily will be provided. She will monitor her symptoms and report any issues.    4. Chronic Obstructive Pulmonary Disease  (COPD).  Refills for her albuterol inhaler and Stiolto inhaler will be sent to the pharmacy. She is advised to use her nebulizer as needed and to inform the provider if she requires more medication.    5. Anxiety.  A refill for Xanax will be sent to the pharmacy.    6. Pain Management.  A refill for tramadol will be sent to the pharmacy.    7. Health Maintenance.  Blood work will be ordered today to check her sugar, kidney function, and A1c levels. She is advised to continue her current diet.    Follow-up  The patient will follow up in December 2024.        Follow Up:   No follow-ups on file.    iWlfrid Victoria  [facility] [place]     Patient or patient representative verbalized consent for the use of Ambient Listening during the visit with  Wilfrid Victoria MD for chart documentation. 9/11/2024  07:12 EDT

## 2024-10-30 ENCOUNTER — OFFICE VISIT (OUTPATIENT)
Dept: FAMILY MEDICINE CLINIC | Facility: CLINIC | Age: 69
End: 2024-10-30
Payer: MEDICARE

## 2024-10-30 VITALS
DIASTOLIC BLOOD PRESSURE: 74 MMHG | OXYGEN SATURATION: 97 % | BODY MASS INDEX: 35.12 KG/M2 | SYSTOLIC BLOOD PRESSURE: 116 MMHG | WEIGHT: 192 LBS | HEART RATE: 102 BPM

## 2024-10-30 DIAGNOSIS — E87.6 HYPOKALEMIA: ICD-10-CM

## 2024-10-30 DIAGNOSIS — R94.31 ABNORMAL EKG: Primary | ICD-10-CM

## 2024-10-30 PROCEDURE — 99213 OFFICE O/P EST LOW 20 MIN: CPT | Performed by: INTERNAL MEDICINE

## 2024-10-30 PROCEDURE — 93000 ELECTROCARDIOGRAM COMPLETE: CPT | Performed by: INTERNAL MEDICINE

## 2024-10-30 NOTE — PROGRESS NOTES
Office Note     Name: Radha Aldana    : 1955     MRN: [id number]     Chief Complaint  Hospital Follow Up Visit (Pt is here for a HFU today. )    Subjective     History of Present Illness:  Radha Aldana is a 69 y.o. female who presents today for:    History of Present Illness  The patient is a 69-year-old female who is here to follow up from the ER.    She was admitted to the ER a few days ago due to back pain, where she received potassium and fluid treatment. She experienced mild dizziness and difficulty breathing, along with occasional chest pain. Her condition has improved since then. She was diagnosed with dehydration and proteinuria.    She is currently on tramadol for pain management but did not take it this morning due to fear. She is also on a daily regimen of potassium and Lasix, which often results in dry mouth and difficulty swallowing, leading to choking on food.      Review of Systems:   Review of Systems    Past Medical History:   Past Medical History:   Diagnosis Date    Anxiety     Chronic GERD     Emphysema     Essential hypertension     Mixed hyperlipidemia        Past Surgical History:   Past Surgical History:   Procedure Laterality Date     SECTION      CHOLECYSTECTOMY      EYE SURGERY Bilateral     HYSTERECTOMY         Family History:   Family History   Problem Relation Age of Onset    Hypertension Father        Social History:   Social History     Socioeconomic History    Marital status:    Tobacco Use    Smoking status: Former     Current packs/day: 0.00     Average packs/day: 1 pack/day for 55.0 years (55.0 ttl pk-yrs)     Types: Cigarettes     Start date:      Quit date: 2013     Years since quittin.8    Smokeless tobacco: Never   Vaping Use    Vaping status: Never Used   Substance and Sexual Activity    Alcohol use: Defer    Drug use: Never    Sexual activity: Defer       Immunizations:   Immunization History   Administered Date(s) Administered     Influenza, Unspecified 10/01/2019    Pneumococcal Conjugate 13-Valent (PCV13) 10/01/2016, 10/01/2018    Pneumococcal Polysaccharide (PPSV23) 10/01/2018        Medications:     Current Outpatient Medications:     albuterol (ACCUNEB) 1.25 MG/3ML nebulizer solution, Take 3 mL by nebulization 3 (Three) Times a Day As Needed for Wheezing. Disp 4 boxes., Disp: 4 each, Rfl: 3    albuterol sulfate  (90 Base) MCG/ACT inhaler, Inhale 2 puffs Every 4 (Four) Hours As Needed for Wheezing., Disp: 18 g, Rfl: 5    ALPRAZolam (XANAX) 1 MG tablet, Take 1 tablet by mouth 2 (Two) Times a Day As Needed for Anxiety. for anxiety, Disp: 60 tablet, Rfl: 2    famotidine (Pepcid) 20 MG tablet, Take 1 tablet by mouth 2 (Two) Times a Day., Disp: 60 tablet, Rfl: 5    fluorouracil (EFUDEX) 5 % cream, Apply 1 Application topically to the appropriate area as directed 2 (Two) Times a Day., Disp: , Rfl:     furosemide (Lasix) 40 MG tablet, Take 1 tablet by mouth Daily., Disp: 90 tablet, Rfl: 1    ketoconazole (NIZORAL) 2 % cream, Apply  topically to the appropriate area as directed 2 (Two) Times a Day., Disp: , Rfl:     levocetirizine (XYZAL) 5 MG tablet, Take 1 tablet by mouth Every Evening., Disp: , Rfl:     metoprolol succinate XL (TOPROL-XL) 50 MG 24 hr tablet, Take 1 tablet by mouth Daily., Disp: , Rfl:     mupirocin (BACTROBAN) 2 % ointment, Apply 1 application  topically to the appropriate area as directed 3 (Three) Times a Day., Disp: 22 g, Rfl: 1    nystatin (MYCOSTATIN) 089927 UNIT/GM ointment, APPLY A THIN LAYER TO CORNERS OF MOUTH TWICE DAILY, Disp: , Rfl:     potassium chloride (K-DUR,KLOR-CON) 10 MEQ ER tablet, Take  by mouth., Disp: , Rfl:     rosuvastatin (CRESTOR) 20 MG tablet, Take 1 tablet by mouth Daily., Disp: 90 tablet, Rfl: 1    Stiolto Respimat 2.5-2.5 MCG/ACT aerosol solution inhaler, Inhale 2 puffs Daily., Disp: 3 each, Rfl: 1    traMADol (ULTRAM) 50 MG tablet, Take 1 tablet by mouth Every 8 (Eight) Hours As Needed  "for Moderate Pain., Disp: 90 tablet, Rfl: 2    Allergies:   Allergies   Allergen Reactions    Atorvastatin Unknown (See Comments)    Sertraline Unknown - High Severity    Zocor [Simvastatin] Myalgia       Objective     Vital Signs  /74   Pulse 102   Wt 87.1 kg (192 lb)   SpO2 97%   BMI 35.12 kg/m²   Estimated body mass index is 35.12 kg/m² as calculated from the following:    Height as of 9/10/24: 157.5 cm (62\").    Weight as of this encounter: 87.1 kg (192 lb).    Vital Signs were reviewed.          Physical Exam  Vitals and nursing note reviewed.   Constitutional:       Appearance: Normal appearance.   Cardiovascular:      Rate and Rhythm: Normal rate and regular rhythm.      Heart sounds: No murmur heard.     No friction rub. No gallop.   Pulmonary:      Effort: Pulmonary effort is normal.      Breath sounds: Normal breath sounds. No wheezing, rhonchi or rales.   Neurological:      Mental Status: She is alert.        Physical Exam         Results      Procedures       Records were obtained and reviewed from patient's recent patient at [facility] date of service 10/28/2024.    She was seen in the ER for progressive weakness over 2 weeks an episode of past out while trying to get out of bed.  She had reported poor p.o. intake for the previous 3 weeks and is oxygen dependent with COPD and asthma at baseline.    She had a CBC which showed mild anemia with H&H of 11.4 and 34.9 but otherwise normal.      She had a BMP which showed mild hypokalemia with potassium of 3.1 as well as creatinine of 2.08 otherwise normal.    She had an EKG which showed septal Q waves and mildly elevated troponin.  Since they did not have a previous EKG to compare this to they had no concerns in the ER and stated \"suspect that these may be old changes\"..  At the time she was having pain in the left lower back denied chest pain so the ER provider felt that it was unlikely to be cardiac in nature and did not " warrant admission because there are no localizing signs of infection.    Assessment and Plan     Diagnoses:    ICD-10-CM ICD-9-CM   1. Abnormal EKG  R94.31 794.31   2. Hypokalemia  E87.6 276.8       Plan:    1. Abnormal EKG    - ECG 12 Lead  - Comprehensive Metabolic Panel  - Magnesium  - Ambulatory Referral to Cardiology    2. Hypokalemia    - Comprehensive Metabolic Panel  - Magnesium       Assessment & Plan  1. Post-ER visit follow-up.  She experienced dizziness, slight trouble breathing, and occasional chest pain. Her EKG was not entirely normal, and her kidney function has been slightly below average in recent tests. Additionally, her potassium levels have been low in the past few tests. An EKG will be performed today. If the results are satisfactory, a blood test will be conducted to assess her kidney function. The blood test will also include checks for liver function, sugar, and potassium levels. If her potassium levels are found to be low, a higher dose of potassium may be prescribed. She has been advised to continue her Lasix medication. Should her kidney function deteriorate, a referral to a nephrologist will be considered.    2. Back pain.  She reports back pain and has been prescribed tramadol for pain management. She was reassured that tramadol is safe for her kidneys and advised to continue using it as needed for pain relief.           Follow Up  No follow-ups on file.    Patient was advised to call the office or seek medical care if  any issues discussed during this visit worsen or persist or if new concerns arise    Patient or patient representative verbalized consent for the use of Ambient Listening during the visit with  Diane Gallego MD for chart documentation. 11/15/2024  12:48 EST    Diane Gallego MD  [facility]  [address] [place] [id number] [phone]

## 2024-10-31 LAB
ALBUMIN SERPL-MCNC: 3.9 G/DL (ref 3.8–4.8)
ALP SERPL-CCNC: 92 IU/L (ref 44–121)
ALT SERPL-CCNC: 16 IU/L (ref 0–32)
AST SERPL-CCNC: 22 IU/L (ref 0–40)
BILIRUB SERPL-MCNC: 0.7 MG/DL (ref 0–1.2)
BUN SERPL-MCNC: 16 MG/DL (ref 8–27)
BUN/CREAT SERPL: 8 (ref 12–28)
CALCIUM SERPL-MCNC: 9.5 MG/DL (ref 8.7–10.3)
CHLORIDE SERPL-SCNC: 98 MMOL/L (ref 96–106)
CO2 SERPL-SCNC: 33 MMOL/L (ref 20–29)
CREAT SERPL-MCNC: 1.95 MG/DL (ref 0.57–1)
EGFRCR SERPLBLD CKD-EPI 2021: 27 ML/MIN/1.73
GLOBULIN SER CALC-MCNC: 2.5 G/DL (ref 1.5–4.5)
GLUCOSE SERPL-MCNC: 99 MG/DL (ref 70–99)
MAGNESIUM SERPL-MCNC: 2.4 MG/DL (ref 1.6–2.3)
POTASSIUM SERPL-SCNC: 3.2 MMOL/L (ref 3.5–5.2)
PROT SERPL-MCNC: 6.4 G/DL (ref 6–8.5)
SODIUM SERPL-SCNC: 145 MMOL/L (ref 134–144)

## 2024-11-01 ENCOUNTER — TELEPHONE (OUTPATIENT)
Dept: FAMILY MEDICINE CLINIC | Facility: CLINIC | Age: 69
End: 2024-11-01
Payer: MEDICARE

## 2024-11-01 ENCOUNTER — TELEPHONE (OUTPATIENT)
Dept: CARDIOLOGY | Facility: CLINIC | Age: 69
End: 2024-11-01
Payer: MEDICARE

## 2024-11-01 NOTE — TELEPHONE ENCOUNTER
UNABLE TO LVM FOR PT TO CALL BACK AND SCHEDULE REFERRAL - NOT SET UP YET      HUB CAN SCHEDULE EARLIEST AVAILABLE APPOINTMENT

## 2024-11-01 NOTE — TELEPHONE ENCOUNTER
Caller: Radha Aldana    Relationship: Self    Best call back number: [phone]     What test was performed: LABS     When was the test performed: 10/30/2024    Where was the test performed: IN OFFICE     Additional notes:   PATIENT WOULD LIKE A CALL BACK TO BE INFORMED OF THE RESULTS OF HER LABS DRAWN IN OFFICE 10/30/2024

## 2024-11-11 ENCOUNTER — TELEPHONE (OUTPATIENT)
Dept: FAMILY MEDICINE CLINIC | Facility: CLINIC | Age: 69
End: 2024-11-11
Payer: MEDICARE

## 2024-11-11 NOTE — TELEPHONE ENCOUNTER
Caller: Radha Aldana    Relationship: Self    Best call back number: [phone]    Caller requesting test results: YES    What test was performed: LAB    When was the test performed: 3-4 WEEKS AGO    Where was the test performed: OFFICE    Additional notes: PT STATED THAT SHE HAS TEST RESULTS ON MYCHART, PT CALLED TO GET TEST RESULTS

## 2024-11-15 RX ORDER — AZITHROMYCIN 250 MG/1
TABLET, FILM COATED ORAL
Qty: 6 TABLET | Refills: 0 | OUTPATIENT
Start: 2024-11-15

## 2024-11-15 NOTE — TELEPHONE ENCOUNTER
I think this was just from her labs earlier this month, the labs were resulted on November 1st but Francisco already called her, it may just be that she just now saw the [facility] notification.  Best I can tell there have not been any new results since they called her at the beginning of the month.      Just in case hse does not recall, here is the message from those labs: Please let patient know her kidney function came back about the same as it was in the ER may slightly better but fortunately was not any worse.  I would like her to see cardiology as a precaution because her EKG in the ER was abnormal even though ours looked better .  She has an appt [facility] Dr Victoria in December, I want her to keep that so he can recheck her kidney function to see if it has improved. If not they may talk about the otion of also going to a kidney specialist at that time. In the meantime she needs to avoid over the counter ibuprofen and aleve because it can make the kidneys worse.

## 2024-11-18 NOTE — TELEPHONE ENCOUNTER
Pt contacted with results. Pt stated she called cardiology and they looked at her EKG and told her it was ok to wait til she saw dr tineo.  Pt stated she wanted a antibiotic for a cold. I scheduled her a same day appt with aki silverio on Wednesday.  Informed pt if her symptoms get worse or severe she will need to be seen at the er

## 2024-12-10 ENCOUNTER — OFFICE VISIT (OUTPATIENT)
Dept: FAMILY MEDICINE CLINIC | Facility: CLINIC | Age: 69
End: 2024-12-10
Payer: MEDICARE

## 2024-12-10 VITALS
SYSTOLIC BLOOD PRESSURE: 140 MMHG | WEIGHT: 190 LBS | HEIGHT: 62 IN | HEART RATE: 88 BPM | OXYGEN SATURATION: 94 % | DIASTOLIC BLOOD PRESSURE: 80 MMHG | BODY MASS INDEX: 34.96 KG/M2

## 2024-12-10 DIAGNOSIS — F51.01 PRIMARY INSOMNIA: ICD-10-CM

## 2024-12-10 DIAGNOSIS — J44.9 CHRONIC OBSTRUCTIVE PULMONARY DISEASE, UNSPECIFIED COPD TYPE: ICD-10-CM

## 2024-12-10 DIAGNOSIS — K21.9 GASTROESOPHAGEAL REFLUX DISEASE WITHOUT ESOPHAGITIS: ICD-10-CM

## 2024-12-10 DIAGNOSIS — R30.0 DYSURIA: ICD-10-CM

## 2024-12-10 DIAGNOSIS — M25.551 RIGHT HIP PAIN: ICD-10-CM

## 2024-12-10 DIAGNOSIS — R73.9 HYPERGLYCEMIA: ICD-10-CM

## 2024-12-10 DIAGNOSIS — E78.5 HYPERLIPIDEMIA, UNSPECIFIED HYPERLIPIDEMIA TYPE: ICD-10-CM

## 2024-12-10 DIAGNOSIS — Z00.00 MEDICARE ANNUAL WELLNESS VISIT, SUBSEQUENT: Primary | Chronic | ICD-10-CM

## 2024-12-10 DIAGNOSIS — I10 PRIMARY HYPERTENSION: ICD-10-CM

## 2024-12-10 DIAGNOSIS — I50.31 ACUTE DIASTOLIC CONGESTIVE HEART FAILURE: ICD-10-CM

## 2024-12-10 DIAGNOSIS — N28.9 RENAL INSUFFICIENCY: ICD-10-CM

## 2024-12-10 DIAGNOSIS — F41.9 ANXIETY: ICD-10-CM

## 2024-12-10 PROCEDURE — G0439 PPPS, SUBSEQ VISIT: HCPCS | Performed by: FAMILY MEDICINE

## 2024-12-10 PROCEDURE — 1170F FXNL STATUS ASSESSED: CPT | Performed by: FAMILY MEDICINE

## 2024-12-10 PROCEDURE — 81003 URINALYSIS AUTO W/O SCOPE: CPT | Performed by: FAMILY MEDICINE

## 2024-12-10 PROCEDURE — 99214 OFFICE O/P EST MOD 30 MIN: CPT | Performed by: FAMILY MEDICINE

## 2024-12-10 PROCEDURE — 1125F AMNT PAIN NOTED PAIN PRSNT: CPT | Performed by: FAMILY MEDICINE

## 2024-12-10 RX ORDER — ALPRAZOLAM 1 MG/1
1 TABLET ORAL 2 TIMES DAILY PRN
Qty: 60 TABLET | Refills: 2 | Status: SHIPPED | OUTPATIENT
Start: 2024-12-10

## 2024-12-10 RX ORDER — FUROSEMIDE 40 MG/1
40 TABLET ORAL DAILY
Qty: 90 TABLET | Refills: 1 | Status: SHIPPED | OUTPATIENT
Start: 2024-12-10

## 2024-12-10 RX ORDER — PREDNISONE 20 MG/1
40 TABLET ORAL DAILY
Qty: 10 TABLET | Refills: 0 | Status: SHIPPED | OUTPATIENT
Start: 2024-12-10 | End: 2024-12-15

## 2024-12-10 RX ORDER — ROSUVASTATIN CALCIUM 20 MG/1
20 TABLET, COATED ORAL DAILY
Qty: 90 TABLET | Refills: 1 | Status: SHIPPED | OUTPATIENT
Start: 2024-12-10

## 2024-12-10 RX ORDER — TRAMADOL HYDROCHLORIDE 50 MG/1
50 TABLET ORAL EVERY 8 HOURS PRN
Qty: 90 TABLET | Refills: 2 | Status: SHIPPED | OUTPATIENT
Start: 2024-12-10

## 2024-12-10 RX ORDER — AZITHROMYCIN 250 MG/1
TABLET, FILM COATED ORAL
Qty: 6 TABLET | Refills: 0 | Status: SHIPPED | OUTPATIENT
Start: 2024-12-10

## 2024-12-10 NOTE — PROGRESS NOTES
Subjective   The ABCs of the Annual Wellness Visit  Medicare Wellness Visit      Radha Aldana is a 69 y.o. patient who presents for a Medicare Wellness Visit.    The following portions of the patient's history were reviewed and   updated as appropriate: allergies, current medications, past family history, past medical history, past social history, past surgical history, and problem list.    Compared to one year ago, the patient's physical   health is the same.  Compared to one year ago, the patient's mental   health is the same.    Recent Hospitalizations:  She was not admitted to the hospital during the last year.     Current Medical Providers:  Patient Care Team:  Wilfrid Victoria MD as PCP - General (Family Medicine)    Outpatient Medications Prior to Visit   Medication Sig Dispense Refill    albuterol (ACCUNEB) 1.25 MG/3ML nebulizer solution Take 3 mL by nebulization 3 (Three) Times a Day As Needed for Wheezing. Disp 4 boxes. 4 each 3    albuterol sulfate  (90 Base) MCG/ACT inhaler Inhale 2 puffs Every 4 (Four) Hours As Needed for Wheezing. 18 g 5    famotidine (Pepcid) 20 MG tablet Take 1 tablet by mouth 2 (Two) Times a Day. 60 tablet 5    fluorouracil (EFUDEX) 5 % cream Apply 1 Application topically to the appropriate area as directed 2 (Two) Times a Day.      ketoconazole (NIZORAL) 2 % cream Apply  topically to the appropriate area as directed 2 (Two) Times a Day.      levocetirizine (XYZAL) 5 MG tablet Take 1 tablet by mouth Every Evening.      metoprolol succinate XL (TOPROL-XL) 50 MG 24 hr tablet Take 1 tablet by mouth Daily.      mupirocin (BACTROBAN) 2 % ointment Apply 1 application  topically to the appropriate area as directed 3 (Three) Times a Day. 22 g 1    nystatin (MYCOSTATIN) 208332 UNIT/GM ointment APPLY A THIN LAYER TO CORNERS OF MOUTH TWICE DAILY      potassium chloride (K-DUR,KLOR-CON) 10 MEQ ER tablet Take  by mouth.      Stiolto Respimat 2.5-2.5 MCG/ACT aerosol solution inhaler  "Inhale 2 puffs Daily. 3 each 1    ALPRAZolam (XANAX) 1 MG tablet Take 1 tablet by mouth 2 (Two) Times a Day As Needed for Anxiety. for anxiety 60 tablet 2    furosemide (Lasix) 40 MG tablet Take 1 tablet by mouth Daily. 90 tablet 1    rosuvastatin (CRESTOR) 20 MG tablet Take 1 tablet by mouth Daily. 90 tablet 1    traMADol (ULTRAM) 50 MG tablet Take 1 tablet by mouth Every 8 (Eight) Hours As Needed for Moderate Pain. 90 tablet 2     No facility-administered medications prior to visit.     Opioid medication/s are on active medication list.  and I have evaluated her active treatment plan and pain score trends (see table).  Vitals:    12/10/24 1424   PainSc:   2   PainLoc: Hip     I have reviewed the chart for potential of high risk medication and harmful drug interactions in the elderly.        Aspirin is not on active medication list.  Aspirin use is not indicated based on review of current medical condition/s. Risk of harm outweighs potential benefits.  .    Patient Active Problem List   Diagnosis    O2 dependent    Iron deficiency anemia     Advance Care Planning Advance Directive is not on file.  ACP discussion was held with the patient during this visit. Patient does not have an advance directive, information provided.            Objective   Vitals:    12/10/24 1424   BP: 140/80   Pulse: 88   SpO2: 94%   Weight: 86.2 kg (190 lb)   Height: 157.5 cm (62\")   PainSc:   2   PainLoc: Hip       Estimated body mass index is 34.75 kg/m² as calculated from the following:    Height as of this encounter: 157.5 cm (62\").    Weight as of this encounter: 86.2 kg (190 lb).    BMI is >= 30 and <35. (Class 1 Obesity). The following options were offered after discussion;: exercise counseling/recommendations and nutrition counseling/recommendations       Does the patient have evidence of cognitive impairment? No                                                                                                Health  Risk " Assessment    Smoking Status:  Social History     Tobacco Use   Smoking Status Former    Current packs/day: 0.00    Average packs/day: 1 pack/day for 55.0 years (55.0 ttl pk-yrs)    Types: Cigarettes    Start date:     Quit date:     Years since quittin.9   Smokeless Tobacco Never     Alcohol Consumption:  Social History     Substance and Sexual Activity   Alcohol Use Defer       Fall Risk Screen  STEADI Fall Risk Assessment was completed, and patient is at LOW risk for falls.Assessment completed on:12/10/2024    Depression Screening   Little interest or pleasure in doing things? Not at all   Feeling down, depressed, or hopeless? Several days   PHQ-2 Total Score 1      Health Habits and Functional and Cognitive Screenin/10/2024     2:27 PM   Functional & Cognitive Status   Do you have difficulty preparing food and eating? No   Do you have difficulty bathing yourself, getting dressed or grooming yourself? No   Do you have difficulty using the toilet? No   Do you have difficulty moving around from place to place? No   Do you have trouble with steps or getting out of a bed or a chair? Yes   Current Diet Well Balanced Diet   Dental Exam Not up to date   Eye Exam Up to date   Exercise (times per week) 7 times per week   Current Exercises Include House Cleaning   Do you need help using the phone?  No   Are you deaf or do you have serious difficulty hearing?  No   Do you need help to go to places out of walking distance? No   Do you need help shopping? No   Do you need help preparing meals?  No   Do you need help with housework?  No   Do you need help with laundry? No   Do you need help taking your medications? No   Do you need help managing money? No   Do you ever drive or ride in a car without wearing a seat belt? No   Have you felt unusual stress, anger or loneliness in the last month? Yes   Who do you live with? Child   If you need help, do you have trouble finding someone available to you? No    Have you been bothered in the last four weeks by sexual problems? No   Do you have difficulty concentrating, remembering or making decisions? No           Age-appropriate Screening Schedule:  Refer to the list below for future screening recommendations based on patient's age, sex and/or medical conditions. Orders for these recommended tests are listed in the plan section. The patient has been provided with a written plan.    Health Maintenance List  Health Maintenance   Topic Date Due    DXA SCAN  Never done    TDAP/TD VACCINES (1 - Tdap) Never done    MAMMOGRAM  Never done    ZOSTER VACCINE (1 of 2) Never done    LUNG CANCER SCREENING  Never done    HEPATITIS C SCREENING  Never done    ANNUAL WELLNESS VISIT  12/08/2023    BMI FOLLOWUP  12/08/2023    INFLUENZA VACCINE  03/31/2025 (Originally 7/1/2024)    COVID-19 Vaccine (1 - 2024-25 season) 10/01/2025 (Originally 9/1/2024)    LIPID PANEL  09/10/2025    COLORECTAL CANCER SCREENING  12/01/2028    Pneumococcal Vaccine 65+  Completed                                                                                                                                                CMS Preventative Services Quick Reference  Risk Factors Identified During Encounter  None Identified    The above risks/problems have been discussed with the patient.  Pertinent information has been shared with the patient in the After Visit Summary.  An After Visit Summary and PPPS were made available to the patient.    Follow Up:   Next Medicare Wellness visit to be scheduled in 1 year.         Additional E&M Note during same encounter follows:  Patient has additional, significant, and separately identifiable condition(s)/problem(s) that require work above and beyond the Medicare Wellness Visit     Chief Complaint  Annual Exam    Subjective    HPI  Radha is also being seen today for additional medical problem/s.       The patient presents for evaluation of abnormal kidney function, urinary  "symptoms, and medication management.    She reports experiencing hip pain and has been to the ER for back discomfort. She also mentions a burning sensation during urination and occasional dark discharge on her pants. She has not had a urine test yet. She has been taking tramadol for pain management and does not use Aleve or ibuprofen. She also takes Excedrin for migraines as needed.    She is currently on a daily regimen of Lasix and has recently refilled her cholesterol medication. She is seeking refills for her Xanax prescription. She has not received the influenza vaccine this year but has previously received the pneumonia vaccine. She is considering the shingles vaccine due to a previous episode of shingles 2 to 3 years ago. She has been prescribed cortisone for lung issues, which she only takes when ill. She is concerned about potential proteinuria.    MEDICATIONS  tramadol, Xanax, Lasix    IMMUNIZATIONS  She has received the pneumonia vaccine a few years ago.  Review of Systems   Constitutional:  Negative for fatigue.   HENT:  Negative for congestion.    Respiratory:  Negative for apnea, cough and shortness of breath.    Cardiovascular:  Negative for chest pain.   Gastrointestinal:  Negative for abdominal pain.   Musculoskeletal:  Negative for arthralgias.   Neurological:  Negative for dizziness.   Psychiatric/Behavioral:  The patient is not nervous/anxious.           Objective   Vital Signs:  /80   Pulse 88   Ht 157.5 cm (62\")   Wt 86.2 kg (190 lb)   SpO2 94%   BMI 34.75 kg/m²   Physical Exam  Vitals and nursing note reviewed.   Constitutional:       General: She is not in acute distress.     Appearance: Normal appearance. She is not ill-appearing.   HENT:      Head: Normocephalic and atraumatic.      Right Ear: Tympanic membrane and ear canal normal.      Left Ear: Tympanic membrane and ear canal normal.      Nose: Nose normal.   Cardiovascular:      Rate and Rhythm: Normal rate and regular " rhythm.      Heart sounds: Normal heart sounds.   Pulmonary:      Effort: Pulmonary effort is normal.      Breath sounds: Normal breath sounds.   Neurological:      Mental Status: She is alert and oriented to person, place, and time. Mental status is at baseline.   Psychiatric:         Mood and Affect: Mood normal.                       Results  Laboratory Studies  Kidney test in October was abnormal. Blood counts have been normal.              Assessment and Plan Additional age appropriate preventative wellness advice topics were discussed during today's preventative wellness exam(some topics already addressed during AWV portion of the note above):    Physical Activity: Advised cardiovascular activity 150 minutes per week as tolerated. (example brisk walk for 30 minutes, 5 days a week).     Nutrition: Discussed nutrition plan with patient. Information shared in after visit summary. Goal is for a well balanced diet to enhance overall health.     Healthy Weight: Discussed current and goal BMI with patient. Steps to attain this goal discussed. Information shared in after visit summary.       1. Abnormal renal function.  Her renal function tests have shown a progressive increase from 1.2 to 1.6 to 1.9. She has been experiencing a burning sensation during urination and occasional dark discharge on her pants. Her blood counts have remained within normal limits. She has been advised to maintain adequate hydration by consuming at least five bottles of water daily. She has been counseled against the use of Excedrin due to its aspirin content, which could potentially impact her renal function. Instead, she has been recommended to use Tylenol for pain management. A urine test will be conducted today to rule out any potential infection. A re-evaluation of her renal function will be performed today. If the results continue to indicate abnormal renal function, a referral to a nephrologist will be considered.    2. Medication  management.  Prescriptions for Xanax, tramadol, and Lasix have been refilled. She has been advised to continue her current medication regimen and to avoid over-the-counter NSAIDs like Aleve or ibuprofen.    3. Health maintenance.  She has been informed about the availability of the shingles vaccine, which consists of two doses administered three months apart. She has been advised to receive this vaccine at her local pharmacy.            Follow Up   No follow-ups on file.  Patient was given instructions and counseling regarding her condition or for health maintenance advice. Please see specific information pulled into the AVS if appropriate.  Patient or patient representative verbalized consent for the use of Ambient Listening during the visit with  Wilfrid Victoria MD for chart documentation. 12/10/2024  21:16 EST

## 2024-12-11 LAB
ALBUMIN SERPL-MCNC: 4.1 G/DL (ref 3.8–4.8)
ALP SERPL-CCNC: 83 IU/L (ref 44–121)
ALT SERPL-CCNC: 16 IU/L (ref 0–32)
AST SERPL-CCNC: 24 IU/L (ref 0–40)
BILIRUB BLD-MCNC: NEGATIVE MG/DL
BILIRUB SERPL-MCNC: 0.5 MG/DL (ref 0–1.2)
BUN SERPL-MCNC: 18 MG/DL (ref 8–27)
BUN/CREAT SERPL: 11 (ref 12–28)
CALCIUM SERPL-MCNC: 10.2 MG/DL (ref 8.7–10.3)
CHLORIDE SERPL-SCNC: 93 MMOL/L (ref 96–106)
CLARITY, POC: ABNORMAL
CO2 SERPL-SCNC: 34 MMOL/L (ref 20–29)
COLOR UR: YELLOW
CREAT SERPL-MCNC: 1.66 MG/DL (ref 0.57–1)
EGFRCR SERPLBLD CKD-EPI 2021: 33 ML/MIN/1.73
EXPIRATION DATE: ABNORMAL
GLOBULIN SER CALC-MCNC: 2.6 G/DL (ref 1.5–4.5)
GLUCOSE SERPL-MCNC: 104 MG/DL (ref 70–99)
GLUCOSE UR STRIP-MCNC: NEGATIVE MG/DL
KETONES UR QL: NEGATIVE
LEUKOCYTE EST, POC: NEGATIVE
Lab: ABNORMAL
NITRITE UR-MCNC: NEGATIVE MG/ML
PH UR: 6 [PH] (ref 5–8)
POTASSIUM SERPL-SCNC: 3.5 MMOL/L (ref 3.5–5.2)
PROT SERPL-MCNC: 6.7 G/DL (ref 6–8.5)
PROT UR STRIP-MCNC: ABNORMAL MG/DL
RBC # UR STRIP: ABNORMAL /UL
SODIUM SERPL-SCNC: 143 MMOL/L (ref 134–144)
SP GR UR: 1.02 (ref 1–1.03)
UROBILINOGEN UR QL: ABNORMAL

## 2024-12-12 LAB
BACTERIA UR CULT: NO GROWTH
BACTERIA UR CULT: NORMAL

## 2024-12-20 ENCOUNTER — TELEPHONE (OUTPATIENT)
Dept: FAMILY MEDICINE CLINIC | Facility: CLINIC | Age: 69
End: 2024-12-20
Payer: MEDICARE

## 2024-12-20 NOTE — TELEPHONE ENCOUNTER
Patient wants a call about her test results. [facility]

## 2024-12-23 NOTE — TELEPHONE ENCOUNTER
Relatively stable overall.  There is some overall increase in kidney function.  Creatinine went down which was a good thing.  No further changes.

## 2024-12-30 RX ORDER — POTASSIUM CHLORIDE 750 MG/1
10 TABLET, EXTENDED RELEASE ORAL DAILY
Qty: 90 TABLET | Refills: 0 | OUTPATIENT
Start: 2024-12-30

## 2025-01-03 ENCOUNTER — TELEPHONE (OUTPATIENT)
Dept: FAMILY MEDICINE CLINIC | Facility: CLINIC | Age: 70
End: 2025-01-03

## 2025-01-03 RX ORDER — POTASSIUM CHLORIDE 750 MG/1
10 TABLET, EXTENDED RELEASE ORAL DAILY
Qty: 90 TABLET | Refills: 0 | Status: SHIPPED | OUTPATIENT
Start: 2025-01-03

## 2025-01-03 NOTE — TELEPHONE ENCOUNTER
Script sent to pharmacy.  Per OV note on 12/10/2024 patient was advised to continue current medication regimen.

## 2025-01-03 NOTE — TELEPHONE ENCOUNTER
Caller: Radha Aldana    Relationship: Self    Best call back number: [phone]    Which medication are you concerned about:     potassium chloride (K-DUR,KLOR-CON) 10 MEQ ER tablet       Who prescribed you this medication: DR GOMEZ    When did you start taking this medication: 1-3 YEARS    What are your concerns: THAT SHE STILL TAKES THIS RX AND HAS FOR YEARS. SHE ISN'T SURE WHY. SHE SAID SHE NEVER TOLD ANYONE SHE'S STOPPED TAKING IT. IF YOU WANT HER TO STOP TAKING IT, PLEASE CALL HER TO ADVISE.  PLEASE REFILL IT FOR HER AT [facility] IN [place].    How long have you had these concerns: FOR A WEEK.

## 2025-04-02 ENCOUNTER — TELEPHONE (OUTPATIENT)
Dept: FAMILY MEDICINE CLINIC | Facility: CLINIC | Age: 70
End: 2025-04-02

## 2025-04-02 DIAGNOSIS — E78.5 HYPERLIPIDEMIA, UNSPECIFIED HYPERLIPIDEMIA TYPE: ICD-10-CM

## 2025-04-02 DIAGNOSIS — M25.551 RIGHT HIP PAIN: ICD-10-CM

## 2025-04-02 DIAGNOSIS — F41.9 ANXIETY: ICD-10-CM

## 2025-04-02 DIAGNOSIS — I50.31 ACUTE DIASTOLIC CONGESTIVE HEART FAILURE: ICD-10-CM

## 2025-04-02 NOTE — TELEPHONE ENCOUNTER
Patient needs refills of the following;     Tramadol 50mg 1 po q8h prn moderate pain     Alprazolam 1mg 1 po 2 times qing prn anxiety     Crestor 20 1 po qd     Lasix 40mg 1 po pd      She was suppose to see you today but we currently do not take her insurance.

## 2025-04-02 NOTE — TELEPHONE ENCOUNTER
Medication requested (name and dose): patient was unable to provide each medication. She is needing each medication filled, if possible.     Pharmacy where request should be sent: [facility] [place]     Additional details provided by patient: Patient is completely out of medication and we do not currently take her insurance. That is the reason why we had to cancel her appointment for today, 04/02/25.     Best call back number: [phone]    Does the patient have less than a 3 day supply:  [x] Yes  [] No    Ernesto Velazco Rep  04/02/25, 14:09 EDT

## 2025-04-04 RX ORDER — FUROSEMIDE 40 MG/1
40 TABLET ORAL DAILY
Qty: 90 TABLET | Refills: 1 | Status: SHIPPED | OUTPATIENT
Start: 2025-04-04

## 2025-04-04 RX ORDER — ROSUVASTATIN CALCIUM 20 MG/1
20 TABLET, COATED ORAL DAILY
Qty: 90 TABLET | Refills: 1 | Status: SHIPPED | OUTPATIENT
Start: 2025-04-04

## 2025-04-04 RX ORDER — ALPRAZOLAM 1 MG/1
1 TABLET ORAL 2 TIMES DAILY PRN
Qty: 60 TABLET | Refills: 2 | Status: SHIPPED | OUTPATIENT
Start: 2025-04-04

## 2025-04-04 RX ORDER — TRAMADOL HYDROCHLORIDE 50 MG/1
50 TABLET ORAL EVERY 8 HOURS PRN
Qty: 90 TABLET | Refills: 2 | Status: SHIPPED | OUTPATIENT
Start: 2025-04-04

## 2025-04-04 NOTE — TELEPHONE ENCOUNTER
Let her know that I sent her meds.  On the controlled, I sent 3 months.  Let her know that I really could not refill after that point though without a visit because they are controlled.  Unless she is seen.

## 2025-04-04 NOTE — TELEPHONE ENCOUNTER
Called patient, no answer and no voicemail set up.    HUB to relay if patient calls back message from Dr. Victoria.    Let her know that I sent her meds.  On the controlled, I sent 3 months.  Let her know that I really could not refill after that point though without a visit because they are controlled.  Unless she is seen.

## 2025-04-07 RX ORDER — POTASSIUM CHLORIDE 750 MG/1
10 TABLET, EXTENDED RELEASE ORAL DAILY
Qty: 30 TABLET | Refills: 1 | Status: SHIPPED | OUTPATIENT
Start: 2025-04-07

## 2025-05-13 ENCOUNTER — OFFICE VISIT (OUTPATIENT)
Dept: FAMILY MEDICINE CLINIC | Facility: CLINIC | Age: 70
End: 2025-05-13
Payer: MEDICARE

## 2025-05-13 VITALS
DIASTOLIC BLOOD PRESSURE: 60 MMHG | HEIGHT: 62 IN | BODY MASS INDEX: 34.96 KG/M2 | OXYGEN SATURATION: 98 % | HEART RATE: 78 BPM | SYSTOLIC BLOOD PRESSURE: 118 MMHG | WEIGHT: 190 LBS

## 2025-05-13 DIAGNOSIS — R30.0 DYSURIA: ICD-10-CM

## 2025-05-13 DIAGNOSIS — F51.01 PRIMARY INSOMNIA: ICD-10-CM

## 2025-05-13 DIAGNOSIS — K21.9 GASTROESOPHAGEAL REFLUX DISEASE WITHOUT ESOPHAGITIS: ICD-10-CM

## 2025-05-13 DIAGNOSIS — F41.9 ANXIETY: ICD-10-CM

## 2025-05-13 DIAGNOSIS — J44.9 CHRONIC OBSTRUCTIVE PULMONARY DISEASE, UNSPECIFIED COPD TYPE: ICD-10-CM

## 2025-05-13 DIAGNOSIS — I10 PRIMARY HYPERTENSION: ICD-10-CM

## 2025-05-13 DIAGNOSIS — H61.23 BILATERAL IMPACTED CERUMEN: ICD-10-CM

## 2025-05-13 DIAGNOSIS — R13.10 DYSPHAGIA, UNSPECIFIED TYPE: Primary | ICD-10-CM

## 2025-05-13 LAB
BILIRUB BLD-MCNC: NEGATIVE MG/DL
CLARITY, POC: CLEAR
COLOR UR: YELLOW
EXPIRATION DATE: ABNORMAL
GLUCOSE UR STRIP-MCNC: NEGATIVE MG/DL
KETONES UR QL: NEGATIVE
LEUKOCYTE EST, POC: NEGATIVE
Lab: ABNORMAL
NITRITE UR-MCNC: NEGATIVE MG/ML
PH UR: 5.5 [PH] (ref 5–8)
PROT UR STRIP-MCNC: ABNORMAL MG/DL
RBC # UR STRIP: ABNORMAL /UL
SP GR UR: 1.03 (ref 1–1.03)
UROBILINOGEN UR QL: NORMAL

## 2025-05-13 RX ORDER — FAMOTIDINE 40 MG/1
40 TABLET, FILM COATED ORAL 2 TIMES DAILY
Qty: 60 TABLET | Refills: 5 | Status: SHIPPED | OUTPATIENT
Start: 2025-05-13

## 2025-05-13 RX ORDER — TIOTROPIUM BROMIDE AND OLODATEROL 3.124; 2.736 UG/1; UG/1
2 SPRAY, METERED RESPIRATORY (INHALATION) DAILY
Qty: 3 EACH | Refills: 1 | Status: SHIPPED | OUTPATIENT
Start: 2025-05-13

## 2025-05-13 RX ORDER — TRAZODONE HYDROCHLORIDE 50 MG/1
50 TABLET ORAL NIGHTLY
Qty: 30 TABLET | Refills: 5 | Status: SHIPPED | OUTPATIENT
Start: 2025-05-13

## 2025-05-13 RX ORDER — POTASSIUM CHLORIDE 750 MG/1
10 TABLET, EXTENDED RELEASE ORAL DAILY
Qty: 90 TABLET | Refills: 1 | Status: SHIPPED | OUTPATIENT
Start: 2025-05-13

## 2025-05-14 ENCOUNTER — TELEPHONE (OUTPATIENT)
Dept: FAMILY MEDICINE CLINIC | Facility: CLINIC | Age: 70
End: 2025-05-14

## 2025-05-14 LAB
ALBUMIN SERPL-MCNC: 3.6 G/DL (ref 3.8–4.8)
ALP SERPL-CCNC: 83 IU/L (ref 44–121)
ALT SERPL-CCNC: 16 IU/L (ref 0–32)
AST SERPL-CCNC: 20 IU/L (ref 0–40)
BASOPHILS # BLD AUTO: 0.1 X10E3/UL (ref 0–0.2)
BASOPHILS NFR BLD AUTO: 1 %
BILIRUB SERPL-MCNC: 0.3 MG/DL (ref 0–1.2)
BUN SERPL-MCNC: 18 MG/DL (ref 8–27)
BUN/CREAT SERPL: 13 (ref 12–28)
CALCIUM SERPL-MCNC: 8.8 MG/DL (ref 8.7–10.3)
CHLORIDE SERPL-SCNC: 104 MMOL/L (ref 96–106)
CO2 SERPL-SCNC: 27 MMOL/L (ref 20–29)
CREAT SERPL-MCNC: 1.37 MG/DL (ref 0.57–1)
EGFRCR SERPLBLD CKD-EPI 2021: 41 ML/MIN/1.73
EOSINOPHIL # BLD AUTO: 0.2 X10E3/UL (ref 0–0.4)
EOSINOPHIL NFR BLD AUTO: 3 %
ERYTHROCYTE [DISTWIDTH] IN BLOOD BY AUTOMATED COUNT: 12.8 % (ref 11.7–15.4)
GLOBULIN SER CALC-MCNC: 2.3 G/DL (ref 1.5–4.5)
GLUCOSE SERPL-MCNC: 111 MG/DL (ref 70–99)
HCT VFR BLD AUTO: 33.1 % (ref 34–46.6)
HGB BLD-MCNC: 10.6 G/DL (ref 11.1–15.9)
IMM GRANULOCYTES # BLD AUTO: 0 X10E3/UL (ref 0–0.1)
IMM GRANULOCYTES NFR BLD AUTO: 0 %
LYMPHOCYTES # BLD AUTO: 2.1 X10E3/UL (ref 0.7–3.1)
LYMPHOCYTES NFR BLD AUTO: 28 %
MCH RBC QN AUTO: 29.4 PG (ref 26.6–33)
MCHC RBC AUTO-ENTMCNC: 32 G/DL (ref 31.5–35.7)
MCV RBC AUTO: 92 FL (ref 79–97)
MONOCYTES # BLD AUTO: 0.6 X10E3/UL (ref 0.1–0.9)
MONOCYTES NFR BLD AUTO: 8 %
NEUTROPHILS # BLD AUTO: 4.6 X10E3/UL (ref 1.4–7)
NEUTROPHILS NFR BLD AUTO: 60 %
PLATELET # BLD AUTO: 241 X10E3/UL (ref 150–450)
POTASSIUM SERPL-SCNC: 3.9 MMOL/L (ref 3.5–5.2)
PROT SERPL-MCNC: 5.9 G/DL (ref 6–8.5)
RBC # BLD AUTO: 3.6 X10E6/UL (ref 3.77–5.28)
SODIUM SERPL-SCNC: 145 MMOL/L (ref 134–144)
WBC # BLD AUTO: 7.6 X10E3/UL (ref 3.4–10.8)

## 2025-05-14 NOTE — TELEPHONE ENCOUNTER
Caller: Radha Aldana    Relationship: Self    Best call back number: [phone]    Caller requesting test results: SELF    What test was performed: LABS    When was the test performed: YESTERDAY    Where was the test performed: [facility]

## 2025-05-14 NOTE — TELEPHONE ENCOUNTER
Spoke with patient and let her know that labs have not been reviewed by provider yet. He will send a letter with results. Pt voiced understanding.

## 2025-05-14 NOTE — PROGRESS NOTES
Follow Up Office Visit      Date of Visit:  2025   Patient Name: Radha Aldana  : 1955   MRN: [id number]     Chief Complaint:    Chief Complaint   Patient presents with    Difficulty Swallowing    impacted ears       History of Present Illness: Radha Aldana is a 70 y.o. female who is here today for follow up.    History of Present Illness  The patient presents for evaluation of dysphagia, cerumen impaction, insomnia, acid reflux, and urinary tract infection.    She reports experiencing dysphagia, which she believes may necessitate another esophageal dilation. She has a history of undergoing this procedure in the past, possibly last year or the year before.    She also mentions a sensation of ear fullness and requests an examination to determine if cerumen removal is required. She has been advised against using Q-tips for ear cleaning.    She is currently on Pepcid 20 mg twice daily for acid reflux and requests a more effective medication.    She has been prescribed an albuterol inhaler but has not required its use recently. Her lung function has improved from 52% to 50%. She continues to use Stiolto, taking 2 puffs daily, and reports a return of her voice. She no longer requires the use of a rescue inhaler.    She is on tramadol for arthritis and pain management, which was last refilled in 2025 for a 3-month supply. She also takes Xanax 1 mg as needed for anxiety.    She is on cholesterol medication, which will last until 10/2025.    She is on potassium and Lasix, both of which require refills.    She occasionally experiences malodorous urine, which she attributes to either her kidneys or alcohol consumption. She maintains adequate hydration with water and consumes coffee to alleviate headaches.    She has discontinued her sleep medication due to ineffectiveness and often remains awake until 6:00 AM. She takes her nerve pill to aid sleep and has previously tried trazodone.      Subjective       Review of Systems:   Review of Systems    Past Medical History:   Past Medical History:   Diagnosis Date    Anxiety     Chronic GERD     Emphysema     Essential hypertension     Mixed hyperlipidemia        Past Surgical History:   Past Surgical History:   Procedure Laterality Date     SECTION      CHOLECYSTECTOMY      EYE SURGERY Bilateral     HYSTERECTOMY         Family History:   Family History   Problem Relation Age of Onset    Hypertension Father        Social History:   Social History     Socioeconomic History    Marital status:    Tobacco Use    Smoking status: Former     Current packs/day: 0.00     Average packs/day: 1 pack/day for 55.0 years (55.0 ttl pk-yrs)     Types: Cigarettes     Start date:      Quit date:      Years since quittin.3    Smokeless tobacco: Never   Vaping Use    Vaping status: Never Used   Substance and Sexual Activity    Alcohol use: Defer    Drug use: Never    Sexual activity: Defer       Medications:     Current Outpatient Medications:     famotidine (Pepcid) 40 MG tablet, Take 1 tablet by mouth 2 (Two) Times a Day., Disp: 60 tablet, Rfl: 5    potassium chloride 10 MEQ CR tablet, Take 1 tablet by mouth Daily., Disp: 90 tablet, Rfl: 1    Stiolto Respimat 2.5-2.5 MCG/ACT aerosol solution inhaler, Inhale 2 puffs Daily., Disp: 3 each, Rfl: 1    albuterol (ACCUNEB) 1.25 MG/3ML nebulizer solution, Take 3 mL by nebulization 3 (Three) Times a Day As Needed for Wheezing. Disp 4 boxes., Disp: 4 each, Rfl: 3    albuterol sulfate  (90 Base) MCG/ACT inhaler, Inhale 2 puffs Every 4 (Four) Hours As Needed for Wheezing., Disp: 18 g, Rfl: 5    ALPRAZolam (XANAX) 1 MG tablet, Take 1 tablet by mouth 2 (Two) Times a Day As Needed for Anxiety. for anxiety, Disp: 60 tablet, Rfl: 2    fluorouracil (EFUDEX) 5 % cream, Apply 1 Application topically to the appropriate area as directed 2 (Two) Times a Day., Disp: , Rfl:     furosemide (Lasix) 40 MG tablet, Take 1 tablet by  "mouth Daily., Disp: 90 tablet, Rfl: 1    ketoconazole (NIZORAL) 2 % cream, Apply  topically to the appropriate area as directed 2 (Two) Times a Day., Disp: , Rfl:     levocetirizine (XYZAL) 5 MG tablet, Take 1 tablet by mouth Every Evening., Disp: , Rfl:     metoprolol succinate XL (TOPROL-XL) 50 MG 24 hr tablet, Take 1 tablet by mouth Daily., Disp: , Rfl:     mupirocin (BACTROBAN) 2 % ointment, Apply 1 application  topically to the appropriate area as directed 3 (Three) Times a Day., Disp: 22 g, Rfl: 1    nystatin (MYCOSTATIN) 416727 UNIT/GM ointment, APPLY A THIN LAYER TO CORNERS OF MOUTH TWICE DAILY, Disp: , Rfl:     rosuvastatin (CRESTOR) 20 MG tablet, Take 1 tablet by mouth Daily., Disp: 90 tablet, Rfl: 1    traMADol (ULTRAM) 50 MG tablet, Take 1 tablet by mouth Every 8 (Eight) Hours As Needed for Moderate Pain., Disp: 90 tablet, Rfl: 2    traZODone (DESYREL) 50 MG tablet, Take 1 tablet by mouth Every Night., Disp: 30 tablet, Rfl: 5    Allergies:   Allergies   Allergen Reactions    Atorvastatin Unknown (See Comments)    Sertraline Unknown - High Severity    Zocor [Simvastatin] Myalgia       Objective     Physical Exam:  Vital Signs:   Vitals:    05/13/25 1506   BP: 118/60   Pulse: 78   SpO2: 98%   Weight: 86.2 kg (190 lb)   Height: 157.5 cm (62\")     Body mass index is 34.75 kg/m².     Physical Exam  Vitals and nursing note reviewed.   Constitutional:       General: She is not in acute distress.     Appearance: Normal appearance. She is not ill-appearing.   HENT:      Head: Normocephalic and atraumatic.      Right Ear: Tympanic membrane and ear canal normal. There is impacted cerumen.      Left Ear: Tympanic membrane and ear canal normal. There is impacted cerumen.      Nose: Nose normal.   Cardiovascular:      Rate and Rhythm: Normal rate and regular rhythm.      Heart sounds: Normal heart sounds.   Pulmonary:      Effort: Pulmonary effort is normal.      Breath sounds: Normal breath sounds.   Neurological:    "   Mental Status: She is alert and oriented to person, place, and time. Mental status is at baseline.   Psychiatric:         Mood and Affect: Mood normal.       Physical Exam      Ear Cerumen Removal    Date/Time: 5/13/2025 8:25 PM    Performed by: Wilfrid Victoria MD  Authorized by: Wilfrid Victoria MD    Anesthesia:  Local Anesthetic: none  Location details: left ear and right ear  Patient tolerance: patient tolerated the procedure well with no immediate complications  Procedure type: irrigation   Sedation:  Patient sedated: no            Results    Assessment / Plan      Assessment/Plan:   Diagnoses and all orders for this visit:    1. Dysphagia, unspecified type (Primary)  -     Ambulatory Referral to Gastroenterology    2. Chronic obstructive pulmonary disease, unspecified COPD type  -     Stiolto Respimat 2.5-2.5 MCG/ACT aerosol solution inhaler; Inhale 2 puffs Daily.  Dispense: 3 each; Refill: 1    3. Primary hypertension  -     CBC & Differential  -     Comprehensive Metabolic Panel    4. Dysuria  -     POCT urinalysis dipstick, automated  -     Urine Culture - Urine, Urine, Clean Catch    5. Bilateral impacted cerumen    6. Anxiety    Other orders  -     famotidine (Pepcid) 40 MG tablet; Take 1 tablet by mouth 2 (Two) Times a Day.  Dispense: 60 tablet; Refill: 5  -     potassium chloride 10 MEQ CR tablet; Take 1 tablet by mouth Daily.  Dispense: 90 tablet; Refill: 1  -     traZODone (DESYREL) 50 MG tablet; Take 1 tablet by mouth Every Night.  Dispense: 30 tablet; Refill: 5  -     Ear Cerumen Removal       Assessment & Plan  1. Dysphagia.  - Reports difficulty swallowing and a history of esophageal dilation.  - Referral to a gastroenterologist in [place] will be made for further evaluation and potential esophageal dilation.    2. Cerumen impaction.  - Experiences earwax buildup affecting her hearing.  - Ears will be irrigated today to remove the excess wax.    3. Insomnia.  - Difficulty sleeping and  previous medications were ineffective.  - Prescription for trazodone will be provided to help manage insomnia.    4. Acid reflux.  - Currently taking Pepcid 20 mg twice a day.  - Dosage will be increased to 40 mg twice a day to better manage symptoms.    5. Urinary tract infection possibility.  - Reports a foul smell in urine, which could indicate a urinary tract infection or dehydration.  - Urinalysis will be conducted today to check for infection.  - Advised to increase fluid intake, including cranberry juice.    6. Medication management.  - On tramadol for arthritis and pain management, last refilled in 2025 for a 3-month supply.  - Takes Xanax 1 mg as needed for anxiety.  - Cholesterol medication will last until 10/2025.  - On potassium and Lasix, both require refills.  - Albuterol inhaler has not been refilled recently.  - Stiolto inhaler is going to  soon.  - Refills for tramadol and Xanax will be provided.  - Cholesterol medication does not need refilling until 10/2025.  - Refills for potassium and Lasix will be provided.  - Refill for albuterol inhaler will be provided.  - New prescription for Stiolto will be sent to pharmacy.    7. Health maintenance.  - Blood pressure is normal.  - Has lost about 10 pounds since September.  - Kidney function showed improvement from October to December.  - Given that it has been approximately 6 months since the last assessment, a re-evaluation is recommended.  - Blood work will be ordered to assess kidney function.        Follow Up:   No follow-ups on file.    Wilfrid Victoria  [facility] [place]     Patient or patient representative verbalized consent for the use of Ambient Listening during the visit with  Wilfrid Victoria MD for chart documentation. 2025  20:25 EDT

## 2025-05-15 LAB
BACTERIA UR CULT: NORMAL
BACTERIA UR CULT: NORMAL

## 2025-05-21 RX ORDER — METOPROLOL SUCCINATE 50 MG/1
50 TABLET, EXTENDED RELEASE ORAL DAILY
OUTPATIENT
Start: 2025-05-21

## 2025-05-21 NOTE — TELEPHONE ENCOUNTER
Per Mauricio Shrestha has been prescribing this for the patient. I spoke with her and she will call his office for refills.

## 2025-05-21 NOTE — TELEPHONE ENCOUNTER
Caller: Radha Aldana    Relationship: Self    Best call back number: [phone]    Requested Prescriptions:   Requested Prescriptions     Pending Prescriptions Disp Refills    metoprolol succinate XL (TOPROL-XL) 50 MG 24 hr tablet       Sig: Take 1 tablet by mouth Daily.        Pharmacy where request should be sent: [facility] [address] [place] [address] [phone] [facility] [phone] [facility]     Last office visit with prescribing clinician: 5/13/2025   Last telemedicine visit with prescribing clinician: Visit date not found   Next office visit with prescribing clinician: 8/13/2025     Additional details provided by patient: PATIENT IS COMPLETELY OUT      Does the patient have less than a 3 day supply:  [x] Yes  [] No    Would you like a call back once the refill request has been completed: [] Yes [x] No    If the office needs to give you a call back, can they leave a voicemail: [] Yes [x] No    Ernesto Wadsworth Rep   05/21/25 11:35 EDT

## 2025-06-04 ENCOUNTER — PREP FOR SURGERY (OUTPATIENT)
Dept: OTHER | Facility: HOSPITAL | Age: 70
End: 2025-06-04
Payer: MEDICARE

## 2025-06-04 DIAGNOSIS — Z99.81 ON HOME O2: ICD-10-CM

## 2025-06-04 DIAGNOSIS — R13.10 DYSPHAGIA, UNSPECIFIED TYPE: Primary | ICD-10-CM

## 2025-06-26 ENCOUNTER — OFFICE VISIT (OUTPATIENT)
Dept: FAMILY MEDICINE CLINIC | Facility: CLINIC | Age: 70
End: 2025-06-26
Payer: MEDICARE

## 2025-06-26 VITALS
DIASTOLIC BLOOD PRESSURE: 74 MMHG | HEIGHT: 62 IN | TEMPERATURE: 99 F | WEIGHT: 184 LBS | OXYGEN SATURATION: 98 % | HEART RATE: 64 BPM | SYSTOLIC BLOOD PRESSURE: 118 MMHG | BODY MASS INDEX: 33.86 KG/M2

## 2025-06-26 DIAGNOSIS — R10.84 GENERALIZED ABDOMINAL PAIN: ICD-10-CM

## 2025-06-26 DIAGNOSIS — R11.10 VOMITING AND DIARRHEA: Primary | ICD-10-CM

## 2025-06-26 DIAGNOSIS — N94.9 ADNEXAL CYST: ICD-10-CM

## 2025-06-26 DIAGNOSIS — R19.7 VOMITING AND DIARRHEA: Primary | ICD-10-CM

## 2025-06-26 RX ORDER — ONDANSETRON 4 MG/1
TABLET, ORALLY DISINTEGRATING ORAL
COMMUNITY
Start: 2025-06-10

## 2025-06-26 NOTE — PROGRESS NOTES
"Chief Complaint  HFU    Subjective          Radha Aldana presents to [facility]  History of Present Illness  Patient in today to follow-up from ER visit from 6/10/2025.  She had gone to the ER with a couple days history of vomiting and diarrhea.  She had a CT scan that did not show  acute process.  It showed constipation and also showed stable cystic lesion to her right ovary and was recommended to have outpatient ultrasound/imaging.  She is here today as she states she has continued to have nausea, vomiting, and diarrhea for the past 2 weeks.  She states she has gotten weaker each day and feels may be dehydrated.  She states she also has abdominal pain.  Denies any blood or black stools.  She states she has not vomited as much in the last few days but has kept diarrhea multiple times per day.      Objective   Vital Signs:   /74   Pulse 64   Temp 99 °F (37.2 °C)   Ht 157.5 cm (62\")   Wt 83.5 kg (184 lb)   SpO2 98%   BMI 33.65 kg/m²     Body mass index is 33.65 kg/m².    Review of Systems   Constitutional:  Negative for fever.   Cardiovascular:  Negative for chest pain.   Gastrointestinal:  Positive for abdominal pain, diarrhea, nausea and vomiting. Negative for blood in stool and constipation.   Genitourinary:  Negative for dysuria and frequency.   Neurological:  Positive for weakness. Negative for dizziness.       Past History:  Medical History: has a past medical history of Anxiety, Chronic GERD, Emphysema, Essential hypertension, and Mixed hyperlipidemia.   Surgical History: has a past surgical history that includes Cholecystectomy; Hysterectomy;  section; and Eye surgery (Bilateral).   Family History: family history includes Hypertension in her father.   Social History: reports that she quit smoking about 12 years ago. Her smoking use included cigarettes. She started smoking about 67 years ago. She has a 55 pack-year smoking history. She has never used " smokeless tobacco. Alcohol use questions deferred to the physician. She reports that she does not use drugs.      Current Outpatient Medications:     albuterol (ACCUNEB) 1.25 MG/3ML nebulizer solution, Take 3 mL by nebulization 3 (Three) Times a Day As Needed for Wheezing. Disp 4 boxes., Disp: 4 each, Rfl: 3    albuterol sulfate  (90 Base) MCG/ACT inhaler, Inhale 2 puffs Every 4 (Four) Hours As Needed for Wheezing., Disp: 18 g, Rfl: 5    ALPRAZolam (XANAX) 1 MG tablet, Take 1 tablet by mouth 2 (Two) Times a Day As Needed for Anxiety. for anxiety, Disp: 60 tablet, Rfl: 2    famotidine (Pepcid) 40 MG tablet, Take 1 tablet by mouth 2 (Two) Times a Day., Disp: 60 tablet, Rfl: 5    fluorouracil (EFUDEX) 5 % cream, Apply 1 Application topically to the appropriate area as directed 2 (Two) Times a Day., Disp: , Rfl:     furosemide (Lasix) 40 MG tablet, Take 1 tablet by mouth Daily., Disp: 90 tablet, Rfl: 1    ketoconazole (NIZORAL) 2 % cream, Apply  topically to the appropriate area as directed 2 (Two) Times a Day., Disp: , Rfl:     levocetirizine (XYZAL) 5 MG tablet, Take 1 tablet by mouth Every Evening., Disp: , Rfl:     metoprolol succinate XL (TOPROL-XL) 50 MG 24 hr tablet, Take 1 tablet by mouth Daily., Disp: , Rfl:     mupirocin (BACTROBAN) 2 % ointment, Apply 1 application  topically to the appropriate area as directed 3 (Three) Times a Day., Disp: 22 g, Rfl: 1    nystatin (MYCOSTATIN) 237399 UNIT/GM ointment, APPLY A THIN LAYER TO CORNERS OF MOUTH TWICE DAILY, Disp: , Rfl:     ondansetron ODT (ZOFRAN-ODT) 4 MG disintegrating tablet, DISSOLVE 1 TABLET IN MOUTH EVERY 6 HOURS, Disp: , Rfl:     potassium chloride 10 MEQ CR tablet, Take 1 tablet by mouth Daily., Disp: 90 tablet, Rfl: 1    rosuvastatin (CRESTOR) 20 MG tablet, Take 1 tablet by mouth Daily., Disp: 90 tablet, Rfl: 1    Stiolto Respimat 2.5-2.5 MCG/ACT aerosol solution inhaler, Inhale 2 puffs Daily., Disp: 3 each, Rfl: 1    traMADol (ULTRAM) 50 MG  tablet, Take 1 tablet by mouth Every 8 (Eight) Hours As Needed for Moderate Pain., Disp: 90 tablet, Rfl: 2    traZODone (DESYREL) 50 MG tablet, Take 1 tablet by mouth Every Night., Disp: 30 tablet, Rfl: 5  Allergies: Atorvastatin, Sertraline, and Zocor [simvastatin]    Physical Exam  Constitutional:       Appearance: Normal appearance.   HENT:      Right Ear: Tympanic membrane normal.      Left Ear: Tympanic membrane normal.      Mouth/Throat:      Mouth: Mucous membranes are dry.   Eyes:      Pupils: Pupils are equal, round, and reactive to light.   Cardiovascular:      Rate and Rhythm: Normal rate and regular rhythm.      Heart sounds: Normal heart sounds.   Pulmonary:      Breath sounds: Normal breath sounds.      Comments: On portable oxygen therapy  Abdominal:      Tenderness: There is abdominal tenderness. There is no guarding or rebound.   Neurological:      Mental Status: She is alert and oriented to person, place, and time.   Psychiatric:         Mood and Affect: Mood normal.         Behavior: Behavior normal.             Assessment and Plan   Diagnoses and all orders for this visit:    1. Vomiting and diarrhea (Primary)  Patient with acute vomiting and diarrhea that has persisted for past 2 weeks; when in ER her potassium was low at 2.9 and mild elevation of creatinine at 1.42 so concern these levels may be abnormal again as concern for her getting dehydrated along with persistent abdominal pain. Family member to transport to ER today for acute evaluation and management.   2. Generalized abdominal pain    3. Adnexal cyst  Discussed with patient it has been recommended to f/up outpatient for cystic structure noted to right adnexal area so will put in referral for gynecology for further evaluation.           Follow Up   No follow-ups on file.  Patient was given instructions and counseling regarding her condition or for health maintenance advice. Please see specific information pulled into the AVS if  appropriate.     Marii Galeas PA-C

## 2025-07-21 DIAGNOSIS — N94.9 ADNEXAL CYST: Primary | ICD-10-CM

## 2025-08-13 ENCOUNTER — OFFICE VISIT (OUTPATIENT)
Dept: FAMILY MEDICINE CLINIC | Facility: CLINIC | Age: 70
End: 2025-08-13
Payer: MEDICARE

## 2025-08-13 VITALS
WEIGHT: 182 LBS | DIASTOLIC BLOOD PRESSURE: 68 MMHG | SYSTOLIC BLOOD PRESSURE: 118 MMHG | HEART RATE: 103 BPM | OXYGEN SATURATION: 98 % | BODY MASS INDEX: 35.73 KG/M2 | HEIGHT: 60 IN

## 2025-08-13 DIAGNOSIS — M54.50 CHRONIC BILATERAL LOW BACK PAIN WITHOUT SCIATICA: ICD-10-CM

## 2025-08-13 DIAGNOSIS — Z13.220 SCREENING FOR LIPID DISORDERS: ICD-10-CM

## 2025-08-13 DIAGNOSIS — Z11.59 ENCOUNTER FOR HEPATITIS C SCREENING TEST FOR LOW RISK PATIENT: ICD-10-CM

## 2025-08-13 DIAGNOSIS — G89.29 CHRONIC BILATERAL LOW BACK PAIN WITHOUT SCIATICA: ICD-10-CM

## 2025-08-13 DIAGNOSIS — M25.551 RIGHT HIP PAIN: ICD-10-CM

## 2025-08-13 DIAGNOSIS — N28.9 RENAL INSUFFICIENCY: ICD-10-CM

## 2025-08-13 DIAGNOSIS — Z99.81 O2 DEPENDENT: Primary | ICD-10-CM

## 2025-08-13 DIAGNOSIS — F41.9 ANXIETY: ICD-10-CM

## 2025-08-13 DIAGNOSIS — K21.9 GASTROESOPHAGEAL REFLUX DISEASE WITHOUT ESOPHAGITIS: ICD-10-CM

## 2025-08-13 DIAGNOSIS — E78.5 HYPERLIPIDEMIA, UNSPECIFIED HYPERLIPIDEMIA TYPE: ICD-10-CM

## 2025-08-13 DIAGNOSIS — I10 PRIMARY HYPERTENSION: ICD-10-CM

## 2025-08-13 DIAGNOSIS — Z79.899 HIGH RISK MEDICATION USE: ICD-10-CM

## 2025-08-13 DIAGNOSIS — I50.31 ACUTE DIASTOLIC CONGESTIVE HEART FAILURE: ICD-10-CM

## 2025-08-13 DIAGNOSIS — J44.9 CHRONIC OBSTRUCTIVE PULMONARY DISEASE, UNSPECIFIED COPD TYPE: ICD-10-CM

## 2025-08-13 DIAGNOSIS — Z13.21 ENCOUNTER FOR VITAMIN DEFICIENCY SCREENING: ICD-10-CM

## 2025-08-13 DIAGNOSIS — K59.04 CHRONIC IDIOPATHIC CONSTIPATION: ICD-10-CM

## 2025-08-13 DIAGNOSIS — R23.8 SKIN IRRITATION: ICD-10-CM

## 2025-08-13 DIAGNOSIS — Z13.29 SCREENING FOR THYROID DISORDER: ICD-10-CM

## 2025-08-13 DIAGNOSIS — D50.9 IRON DEFICIENCY ANEMIA, UNSPECIFIED IRON DEFICIENCY ANEMIA TYPE: ICD-10-CM

## 2025-08-13 DIAGNOSIS — Z13.1 SCREENING FOR DIABETES MELLITUS: ICD-10-CM

## 2025-08-13 LAB
AMPHET+METHAMPHET UR QL: NEGATIVE
AMPHETAMINE INTERNAL CONTROL: ABNORMAL
AMPHETAMINES UR QL: NEGATIVE
BARBITURATE INTERNAL CONTROL: ABNORMAL
BARBITURATES UR QL SCN: NEGATIVE
BENZODIAZ UR QL SCN: POSITIVE
BENZODIAZEPINE INTERNAL CONTROL: ABNORMAL
BUPRENORPHINE INTERNAL CONTROL: ABNORMAL
BUPRENORPHINE SERPL-MCNC: NEGATIVE NG/ML
CANNABINOIDS SERPL QL: NEGATIVE
COCAINE INTERNAL CONTROL: ABNORMAL
COCAINE UR QL: NEGATIVE
EXPIRATION DATE: ABNORMAL
Lab: ABNORMAL
MDMA (ECSTASY) INTERNAL CONTROL: ABNORMAL
MDMA UR QL SCN: NEGATIVE
METHADONE INTERNAL CONTROL: ABNORMAL
METHADONE UR QL SCN: NEGATIVE
METHAMPHETAMINE INTERNAL CONTROL: ABNORMAL
OPIATES INTERNAL CONTROL: ABNORMAL
OPIATES UR QL: NEGATIVE
OXYCODONE INTERNAL CONTROL: ABNORMAL
OXYCODONE UR QL SCN: NEGATIVE
PCP UR QL SCN: NEGATIVE
PHENCYCLIDINE INTERNAL CONTROL: ABNORMAL
THC INTERNAL CONTROL: ABNORMAL

## 2025-08-13 RX ORDER — ALBUTEROL SULFATE 1.25 MG/3ML
1 SOLUTION RESPIRATORY (INHALATION) 3 TIMES DAILY PRN
Qty: 4 EACH | Refills: 3 | Status: SHIPPED | OUTPATIENT
Start: 2025-08-13

## 2025-08-13 RX ORDER — TIOTROPIUM BROMIDE AND OLODATEROL 3.124; 2.736 UG/1; UG/1
2 SPRAY, METERED RESPIRATORY (INHALATION) DAILY
Qty: 3 EACH | Refills: 1 | Status: SHIPPED | OUTPATIENT
Start: 2025-08-13

## 2025-08-13 RX ORDER — METOPROLOL SUCCINATE 50 MG/1
50 TABLET, EXTENDED RELEASE ORAL DAILY
Qty: 90 TABLET | Refills: 1 | Status: SHIPPED | OUTPATIENT
Start: 2025-08-13

## 2025-08-13 RX ORDER — ALBUTEROL SULFATE 90 UG/1
2 INHALANT RESPIRATORY (INHALATION) EVERY 4 HOURS PRN
Qty: 18 G | Refills: 5 | Status: SHIPPED | OUTPATIENT
Start: 2025-08-13

## 2025-08-13 RX ORDER — POTASSIUM CHLORIDE 750 MG/1
10 TABLET, EXTENDED RELEASE ORAL DAILY
Qty: 90 TABLET | Refills: 1 | Status: SHIPPED | OUTPATIENT
Start: 2025-08-13

## 2025-08-13 RX ORDER — ROSUVASTATIN CALCIUM 20 MG/1
20 TABLET, COATED ORAL DAILY
Qty: 90 TABLET | Refills: 1 | Status: SHIPPED | OUTPATIENT
Start: 2025-08-13

## 2025-08-13 RX ORDER — TRAMADOL HYDROCHLORIDE 50 MG/1
50 TABLET ORAL EVERY 8 HOURS PRN
Qty: 90 TABLET | Refills: 2 | Status: SHIPPED | OUTPATIENT
Start: 2025-08-13

## 2025-08-13 RX ORDER — FUROSEMIDE 40 MG/1
40 TABLET ORAL DAILY
Qty: 90 TABLET | Refills: 1 | Status: SHIPPED | OUTPATIENT
Start: 2025-08-13

## 2025-08-13 RX ORDER — TRAZODONE HYDROCHLORIDE 50 MG/1
50 TABLET ORAL NIGHTLY
Qty: 30 TABLET | Refills: 2 | Status: SHIPPED | OUTPATIENT
Start: 2025-08-13

## 2025-08-13 RX ORDER — MUPIROCIN CALCIUM 20 MG/G
1 CREAM TOPICAL 3 TIMES DAILY
Qty: 30 G | Refills: 1 | Status: SHIPPED | OUTPATIENT
Start: 2025-08-13

## 2025-08-13 RX ORDER — ALPRAZOLAM 1 MG/1
1 TABLET ORAL 2 TIMES DAILY PRN
Qty: 60 TABLET | Refills: 2 | Status: SHIPPED | OUTPATIENT
Start: 2025-08-13

## 2025-08-13 RX ORDER — OMEPRAZOLE 40 MG/1
40 CAPSULE, DELAYED RELEASE ORAL 2 TIMES DAILY
Qty: 180 CAPSULE | Refills: 1 | Status: SHIPPED | OUTPATIENT
Start: 2025-08-13

## 2025-08-13 RX ORDER — LEVOCETIRIZINE DIHYDROCHLORIDE 5 MG/1
5 TABLET, FILM COATED ORAL EVERY EVENING
Qty: 30 TABLET | Refills: 3 | Status: SHIPPED | OUTPATIENT
Start: 2025-08-13

## 2025-08-14 LAB
25(OH)D3+25(OH)D2 SERPL-MCNC: 42 NG/ML (ref 30–100)
ALBUMIN SERPL-MCNC: 4.1 G/DL (ref 3.8–4.8)
ALP SERPL-CCNC: 94 IU/L (ref 44–121)
ALT SERPL-CCNC: 15 IU/L (ref 0–32)
AST SERPL-CCNC: 23 IU/L (ref 0–40)
BASOPHILS # BLD AUTO: 0.1 X10E3/UL (ref 0–0.2)
BASOPHILS NFR BLD AUTO: 1 %
BILIRUB SERPL-MCNC: 0.4 MG/DL (ref 0–1.2)
BUN SERPL-MCNC: 15 MG/DL (ref 8–27)
BUN/CREAT SERPL: 10 (ref 12–28)
CALCIUM SERPL-MCNC: 9.5 MG/DL (ref 8.7–10.3)
CHLORIDE SERPL-SCNC: 96 MMOL/L (ref 96–106)
CHOLEST SERPL-MCNC: 138 MG/DL (ref 100–199)
CO2 SERPL-SCNC: 27 MMOL/L (ref 20–29)
CREAT SERPL-MCNC: 1.48 MG/DL (ref 0.57–1)
EGFRCR SERPLBLD CKD-EPI 2021: 37 ML/MIN/1.73
EOSINOPHIL # BLD AUTO: 0.1 X10E3/UL (ref 0–0.4)
EOSINOPHIL NFR BLD AUTO: 1 %
ERYTHROCYTE [DISTWIDTH] IN BLOOD BY AUTOMATED COUNT: 13 % (ref 11.7–15.4)
GLOBULIN SER CALC-MCNC: 2.8 G/DL (ref 1.5–4.5)
GLUCOSE SERPL-MCNC: 103 MG/DL (ref 70–99)
HBA1C MFR BLD: 5.5 % (ref 4.8–5.6)
HCT VFR BLD AUTO: 37.5 % (ref 34–46.6)
HCV IGG SERPL QL IA: NON REACTIVE
HDLC SERPL-MCNC: 55 MG/DL
HGB BLD-MCNC: 11.6 G/DL (ref 11.1–15.9)
IMM GRANULOCYTES # BLD AUTO: 0 X10E3/UL (ref 0–0.1)
IMM GRANULOCYTES NFR BLD AUTO: 0 %
LDLC SERPL CALC-MCNC: 63 MG/DL (ref 0–99)
LYMPHOCYTES # BLD AUTO: 2.6 X10E3/UL (ref 0.7–3.1)
LYMPHOCYTES NFR BLD AUTO: 21 %
MCH RBC QN AUTO: 27.8 PG (ref 26.6–33)
MCHC RBC AUTO-ENTMCNC: 30.9 G/DL (ref 31.5–35.7)
MCV RBC AUTO: 90 FL (ref 79–97)
MONOCYTES # BLD AUTO: 1 X10E3/UL (ref 0.1–0.9)
MONOCYTES NFR BLD AUTO: 8 %
NEUTROPHILS # BLD AUTO: 8.3 X10E3/UL (ref 1.4–7)
NEUTROPHILS NFR BLD AUTO: 69 %
PLATELET # BLD AUTO: 285 X10E3/UL (ref 150–450)
POTASSIUM SERPL-SCNC: 3.4 MMOL/L (ref 3.5–5.2)
PROT SERPL-MCNC: 6.9 G/DL (ref 6–8.5)
RBC # BLD AUTO: 4.17 X10E6/UL (ref 3.77–5.28)
SODIUM SERPL-SCNC: 141 MMOL/L (ref 134–144)
T4 FREE SERPL-MCNC: 1.17 NG/DL (ref 0.82–1.77)
TRIGL SERPL-MCNC: 112 MG/DL (ref 0–149)
TSH SERPL DL<=0.005 MIU/L-ACNC: 1.41 UIU/ML (ref 0.45–4.5)
VIT B12 SERPL-MCNC: 289 PG/ML (ref 232–1245)
VLDLC SERPL CALC-MCNC: 20 MG/DL (ref 5–40)
WBC # BLD AUTO: 12 X10E3/UL (ref 3.4–10.8)